# Patient Record
Sex: FEMALE | Race: WHITE | Employment: OTHER | URBAN - METROPOLITAN AREA
[De-identification: names, ages, dates, MRNs, and addresses within clinical notes are randomized per-mention and may not be internally consistent; named-entity substitution may affect disease eponyms.]

---

## 2017-06-22 ENCOUNTER — OFFICE VISIT (OUTPATIENT)
Dept: INTERNAL MEDICINE CLINIC | Age: 72
End: 2017-06-22

## 2017-06-22 VITALS
TEMPERATURE: 98.5 F | BODY MASS INDEX: 24.11 KG/M2 | SYSTOLIC BLOOD PRESSURE: 120 MMHG | RESPIRATION RATE: 16 BRPM | OXYGEN SATURATION: 98 % | HEIGHT: 63 IN | DIASTOLIC BLOOD PRESSURE: 80 MMHG | HEART RATE: 73 BPM | WEIGHT: 136.1 LBS

## 2017-06-22 DIAGNOSIS — I95.1 ORTHOSTASIS: ICD-10-CM

## 2017-06-22 DIAGNOSIS — S66.911D WRIST STRAIN, RIGHT, SUBSEQUENT ENCOUNTER: Primary | ICD-10-CM

## 2017-06-22 NOTE — MR AVS SNAPSHOT
Visit Information Date & Time Provider Department Dept. Phone Encounter #  
 6/22/2017 10:45 AM Armando Pal MD [place] [address] [facility] [phone] Follow-up Instructions Return in about 6 months (around 12/22/2017) for F/u wrist. Upcoming Health Maintenance Date Due ZOSTER VACCINE AGE 60> 1/11/2007 COLONOSCOPY 10/1/2015 GLAUCOMA SCREENING Q2Y 1/1/2017 BREAST CANCER SCRN MAMMOGRAM 4/30/2017 INFLUENZA AGE 9 TO ADULT 8/1/2017 MEDICARE YEARLY EXAM 8/6/2017 Pneumococcal 65+ High/Highest Risk (2 of 2 - PPSV23) 9/27/2017 DTaP/Tdap/Td series (2 - Td) 10/18/2021 Allergies as of 6/22/2017  Review Complete On: 6/22/2017 By: Armando Pal MD  
  
 Severity Noted Reaction Type Reactions Penicillins  07/18/2011    Anaphylaxis Current Immunizations  Reviewed on 10/18/2013 Name Date Influenza Vaccine 10/1/2016, 10/9/2013 Pneumococcal Vaccine (Unspecified Type) 9/27/2012 Tdap 10/18/2011 Not reviewed this visit You Were Diagnosed With   
  
 Codes Comments Wrist strain, right, subsequent encounter    -  Primary ICD-10-CM: T08.659R ICD-9-CM: V58.89, 842.00 Vitals BP Pulse Temp Resp Height(growth percentile) Weight(growth percentile) 120/80 (BP 1 Location: Left arm, BP Patient Position: Sitting) 73 98.5 °F (36.9 °C) (Oral) 16 5' 2.75\" (1.594 m) 136 lb 1.6 oz (61.7 kg) SpO2 BMI OB Status Smoking Status 98% 24.3 kg/m2 Hysterectomy Never Smoker Vitals History BMI and BSA Data Body Mass Index Body Surface Area  
 24.3 kg/m 2 1.65 m 2 Preferred Pharmacy Pharmacy Name Phone [id number] [place], [address] Ziggy Flores [phone] Your Updated Medication List  
  
   
This list is accurate as of: 6/22/17 11:11 AM.  Always use your most recent med list.  
  
  
  
  
 calcium carbonate 200 mg calcium (500 mg) Merleen Prude Commonly known as:  SHERITAS Take 1 Tab by mouth daily. omega-3 fatty acids-vitamin e 1,000 mg Cap Take 1 Cap by mouth.  
  
 pneumococcal 13 humphrey conj dip 0.5 mL Syrg injection Commonly known as:  PREVNAR 13 (PF)  
0.5 mL by IntraMUSCular route PRIOR TO DISCHARGE for 1 dose. PLEASE FAX RECEIPT OF ADMINISTRATION TO OFFICE ([phone]). Give 1 month apart from Zostavax We Performed the Following AMB SUPPLY ORDER [[id number] Custom] Comments:  
 Theraband resistive band (red and green) Follow-up Instructions Return in about 6 months (around 12/22/2017) for F/u wrist.  
  
  
Patient Instructions Stay well hydrated. Change positions slowly. Contract large muscles prior to position change. Wear support stockings. Do wrist stretching exercises. Wrist Sprain: Rehab Exercises Your Care Instructions Here are some examples of typical rehabilitation exercises for your condition. Start each exercise slowly. Ease off the exercise if you start to have pain. Your doctor or your physical or occupational therapist will tell you when you can start these exercises and which ones will work best for you. How to do the exercises Resisted wrist extension 1. Sit leaning forward with your legs slightly spread. Then place your forearm on your thigh with your affected hand and wrist in front of your knee. 2. Grasp one end of an exercise band with your palm down. Step on the other end. 
3. Slowly bend your wrist upward for a count of 2. Then lower your wrist slowly to a count of 5. 
4. Repeat 8 to 12 times. Resisted wrist flexion 1. Sit leaning forward with your legs slightly spread. Then place your forearm on your thigh with your affected hand and wrist in front of your knee. 2. Grasp one end of an exercise band with your palm up. Step on the other end. 
3. Slowly bend your wrist upward for a count of 2. Then lower your wrist slowly to a count of 5. 
4. Repeat 8 to 12 times. Resisted radial deviation 1. Sit leaning forward with your legs slightly spread. Then place your forearm on your thigh with your affected hand and wrist in front of your knee. 2. Grasp one end of an exercise band with your hand facing toward your other thigh. Step on the other end. 
3. Slowly bend your wrist upward for a count of 2. Then lower your wrist slowly to a count of 5. 
4. Repeat 8 to 12 times. Resisted ulnar deviation 1. Sit leaning forward with your legs slightly spread. Then place your forearm on your thigh with your affected hand and wrist by the inside of your knee. 2. Grasp one end of an exercise band with your palm down. Step on the other end with the foot opposite the hand holding the band. 3. Slowly bend your wrist outward and toward your knee for a count of 2. Then slowly move your wrist back to the starting position to a count of 5. 
4. Repeat 8 to 12 times. Resisted forearm pronation 1. Sit leaning forward with your legs slightly spread. Then place your forearm on your thigh with your affected hand and wrist in front of your knee. 2. Grasp one end of an exercise band with your palm up. Step on the other end. 3. Keeping your wrist straight, roll your palm inward toward your thigh for a count of 2. Then slowly move your wrist back to the starting position to a count of 5. 
4. Repeat 8 to 12 times. Resisted supination 1. Sit leaning forward with your legs slightly spread. Then place your forearm on your thigh with your affected hand and wrist in front of your knee. 2. Grasp one end of an exercise band with your palm down. Step on the other end. 3. Keeping your wrist straight, roll your palm outward and away from your thigh for a count of 2. Then slowly move your wrist back to the starting position to a count of 5. 
4. Repeat 8 to 12 times. Follow-up care is a key part of your treatment and safety.  Be sure to make and go to all appointments, and call your doctor if you are having problems. It's also a good idea to know your test results and keep a list of the medicines you take. Where can you learn more? Go to http://lucas-sean.info/. Enter S110 in the search box to learn more about \"Wrist Sprain: Rehab Exercises. \" Current as of: March 21, 2017 Content Version: 11.3 © 3441-5228 [facility]. Care instructions adapted under license by [employer] (which disclaims liability or warranty for this information). If you have questions about a medical condition or this instruction, always ask your healthcare professional. [address] any warranty or liability for your use of this information. Introducing [facility]! [employer] introduces [email or web address] patient portal. Now you can access parts of your medical record, email your doctor's office, and request medication refills online. 1. In your internet browser, go to [email or web address] 2. Click on the First Time User? Click Here link in the Sign In box. You will see the New Member Sign Up page. 3. Enter your [email or web address] Access Code exactly as it appears below. You will not need to use this code after youve completed the sign-up process. If you do not sign up before the expiration date, you must request a new code. · [email or web address] Access Code: [email or web address] Expires: 9/20/2017 11:11 AM 
 
4. Enter the last four digits of your Social Security Number (xxxx) and Date of Birth (mm/dd/yyyy) as indicated and click Submit. You will be taken to the next sign-up page. 5. Create a Clarit ID. This will be your [email or web address] login ID and cannot be changed, so think of one that is secure and easy to remember. 6. Create a [email or web address] password. You can change your password at any time. 7. Enter your Password Reset Question and Answer. This can be used at a later time if you forget your password. 8. Enter your e-mail address. You will receive e-mail notification when new information is available in [address] 9. Click Sign Up. You can now view and download portions of your medical record. 10. Click the Download Summary menu link to download a portable copy of your medical information. If you have questions, please visit the Frequently Asked Questions section of the [employer] website. Remember, [employer] is NOT to be used for urgent needs. For medical emergencies, dial 911. Now available from your iPhone and Android! Please provide this summary of care documentation to your next provider. Your primary care clinician is listed as Dana Domingo. If you have any questions after today's visit, please call [phone].

## 2017-06-22 NOTE — PROGRESS NOTES
Chief Complaint   Patient presents with    Hand Pain     f/u     Reviewed record in preparation for visit and have obtained necessary documentation. Identified pt with two pt identifiers(name and ). Health Maintenance Due   Topic    ZOSTER VACCINE AGE 60>     COLONOSCOPY     GLAUCOMA SCREENING Q2Y     BREAST CANCER SCRN MAMMOGRAM          Chief Complaint   Patient presents with    Hand Pain     f/u        Wt Readings from Last 3 Encounters:   17 136 lb 1.6 oz (61.7 kg)   16 136 lb (61.7 kg)   16 136 lb (61.7 kg)     Temp Readings from Last 3 Encounters:   17 98.5 °F (36.9 °C) (Oral)   16 97.1 °F (36.2 °C) (Oral)   16 96.4 °F (35.8 °C) (Oral)     BP Readings from Last 3 Encounters:   17 120/80   16 120/60   16 122/74     Pulse Readings from Last 3 Encounters:   17 73   16 (!) 56   16 87           Learning Assessment:  :     Learning Assessment 3/6/2014   PRIMARY LEARNER Patient   HIGHEST LEVEL OF EDUCATION - PRIMARY LEARNER  4 YEARS OF COLLEGE   BARRIERS PRIMARY LEARNER NONE   CO-LEARNER CAREGIVER No   PRIMARY LANGUAGE ENGLISH    NEED No   LEARNER PREFERENCE PRIMARY LISTENING     READING     DEMONSTRATION   LEARNING SPECIAL TOPICS no   ANSWERED BY patient   RELATIONSHIP SELF   ASSESSMENT COMMENT none       Depression Screening:  :     PHQ over the last two weeks 2016   Little interest or pleasure in doing things Not at all   Feeling down, depressed or hopeless Not at all   Total Score PHQ 2 0       Fall Risk Assessment:  :     Fall Risk Assessment, last 12 mths 2016   Able to walk? Yes   Fall in past 12 months? Yes   Fall with injury? No   Number of falls in past 12 months 1   Fall Risk Score 1       Abuse Screening:  :     Abuse Screening Questionnaire 3/6/2014   Do you ever feel afraid of your partner? N   Are you in a relationship with someone who physically or mentally threatens you?  N   Is it safe for you to go home? Y       Coordination of Care Questionnaire:  :     1) Have you been to an emergency room, urgent care clinic since your last visit? no   Hospitalized since your last visit? no             2) Have you seen or consulted any other health care providers outside of [address] [place] [address] since your last visit? no  (Include any pap smears or colon screenings in this section.)    3) Do you have an Advance Directive on file? no    4) Are you interested in receiving information on Advance Directives? NO      Patient is accompanied by self I have received verbal consent from Emily Cordova to discuss any/all medical information while they are present in the room. Reviewed record  In preparation for visit and have obtained necessary documentation.

## 2017-06-22 NOTE — PATIENT INSTRUCTIONS
Stay well hydrated. Change positions slowly. Contract large muscles prior to position change. Wear support stockings. Do wrist stretching exercises. Wrist Sprain: Rehab Exercises  Your Care Instructions  Here are some examples of typical rehabilitation exercises for your condition. Start each exercise slowly. Ease off the exercise if you start to have pain. Your doctor or your physical or occupational therapist will tell you when you can start these exercises and which ones will work best for you. How to do the exercises  Resisted wrist extension    1. Sit leaning forward with your legs slightly spread. Then place your forearm on your thigh with your affected hand and wrist in front of your knee. 2. Grasp one end of an exercise band with your palm down. Step on the other end.  3. Slowly bend your wrist upward for a count of 2. Then lower your wrist slowly to a count of 5.  4. Repeat 8 to 12 times. Resisted wrist flexion    1. Sit leaning forward with your legs slightly spread. Then place your forearm on your thigh with your affected hand and wrist in front of your knee. 2. Grasp one end of an exercise band with your palm up. Step on the other end.  3. Slowly bend your wrist upward for a count of 2. Then lower your wrist slowly to a count of 5.  4. Repeat 8 to 12 times. Resisted radial deviation    1. Sit leaning forward with your legs slightly spread. Then place your forearm on your thigh with your affected hand and wrist in front of your knee. 2. Grasp one end of an exercise band with your hand facing toward your other thigh. Step on the other end.  3. Slowly bend your wrist upward for a count of 2. Then lower your wrist slowly to a count of 5.  4. Repeat 8 to 12 times. Resisted ulnar deviation    1. Sit leaning forward with your legs slightly spread. Then place your forearm on your thigh with your affected hand and wrist by the inside of your knee.   2. Grasp one end of an exercise band with your palm down. Step on the other end with the foot opposite the hand holding the band. 3. Slowly bend your wrist outward and toward your knee for a count of 2. Then slowly move your wrist back to the starting position to a count of 5.  4. Repeat 8 to 12 times. Resisted forearm pronation    1. Sit leaning forward with your legs slightly spread. Then place your forearm on your thigh with your affected hand and wrist in front of your knee. 2. Grasp one end of an exercise band with your palm up. Step on the other end. 3. Keeping your wrist straight, roll your palm inward toward your thigh for a count of 2. Then slowly move your wrist back to the starting position to a count of 5.  4. Repeat 8 to 12 times. Resisted supination    1. Sit leaning forward with your legs slightly spread. Then place your forearm on your thigh with your affected hand and wrist in front of your knee. 2. Grasp one end of an exercise band with your palm down. Step on the other end. 3. Keeping your wrist straight, roll your palm outward and away from your thigh for a count of 2. Then slowly move your wrist back to the starting position to a count of 5.  4. Repeat 8 to 12 times. Follow-up care is a key part of your treatment and safety. Be sure to make and go to all appointments, and call your doctor if you are having problems. It's also a good idea to know your test results and keep a list of the medicines you take. Where can you learn more? Go to http://lucas-sean.info/. Enter S110 in the search box to learn more about \"Wrist Sprain: Rehab Exercises. \"  Current as of: March 21, 2017  Content Version: 11.3  © 6558-0043 [employer]. Care instructions adapted under license by [facility] (which disclaims liability or warranty for this information).  If you have questions about a medical condition or this instruction, always ask your healthcare professional. Lloyd Cardoza any warranty or liability for your use of this information.

## 2017-06-22 NOTE — PROGRESS NOTES
Subjective:     Chief Complaint   Patient presents with    Hand Pain     f/u     She  is a 81y.o. year old female who presents for evaluation. Here for follow up of right wrist pain. It has gotten better but when she works on the computer a lot she will have pain in thenar region of her hand. Describes am episode that occurred several weeks ago where she was standing and felt lightheaded and dizzy with a sensation of her heart beating hard. She had that happen one other time as well. Historical Data    Past Medical History:   Diagnosis Date    Arthritis     spine    Ductal carcinoma in situ (DCIS) of right breast 2009    lumpectomy x2, XRT (followed by Dr. Toni Pabon)   Joanna Ernandez History of colon polyps     Osteopenia 09/2016    mild, R fem neck -1.0        Past Surgical History:   Procedure Laterality Date    HX BLADDER SUSPENSION      HX BREAST LUMPECTOMY      x2 (right)    HX COLONOSCOPY  2010    normal (but has had a h/o colon polyps), repeat q5 yrs, Dr. Ariana Emerson Prescriptions as of 6/22/2017   Medication Sig Dispense Refill    pneumococcal 13 humphrey conj dip (PREVNAR 13, PF,) 0.5 mL syrg injection 0.5 mL by IntraMUSCular route PRIOR TO DISCHARGE for 1 dose. PLEASE FAX RECEIPT OF ADMINISTRATION TO OFFICE ([phone]). Give 1 month apart from Zostavax 0.5 mL 0    calcium carbonate (TUMS) 200 mg calcium (500 mg) Chew Take 1 Tab by mouth daily. 30 Tab 3    omega-3 fatty acids-vitamin e 1,000 mg cap Take 1 Cap by mouth. No facility-administered encounter medications on file as of 6/22/2017. Allergies   Allergen Reactions    Penicillins Anaphylaxis        Social History     Social History    Marital status: SINGLE     Spouse name: N/A    Number of children: N/A    Years of education: N/A     Occupational History    Not on file. Social History Main Topics    Smoking status: Never Smoker    Smokeless tobacco: Never Used    Alcohol use No    Drug use:  No  Sexual activity: Not Currently     Other Topics Concern    Not on file     Social History Narrative    ** Merged History Encounter **             Review of Systems  Pertinent items are noted in HPI. Objective:     Vitals:    06/22/17 1043   BP: 120/80   Pulse: 73   Resp: 16   Temp: 98.5 °F (36.9 °C)   TempSrc: Oral   SpO2: 98%   Weight: 136 lb 1.6 oz (61.7 kg)   Height: 5' 2.75\" (1.594 m)     Pleasant WF. Orthostatic BP:  138/90 lying / 110/70 standing  Right hand: Slight tenderness over the thenar eminence. Pain with extension of wrist.    ASSESSMENT / PLAN:   1. Wrist strain, right, subsequent encounter  · PT offered but declined. · Home rehabilitation exercises provided. - AMB SUPPLY ORDER    2. Orthostasis  · Stay well hydrated. · Avoid rapid position changes. · Support stockings. · Additional studies if persists. Patient Instructions   Stay well hydrated. Change positions slowly. Contract large muscles prior to position change. Wear support stockings. Do wrist stretching exercises. Wrist Sprain: Rehab Exercises  Your Care Instructions  Here are some examples of typical rehabilitation exercises for your condition. Start each exercise slowly. Ease off the exercise if you start to have pain. Your doctor or your physical or occupational therapist will tell you when you can start these exercises and which ones will work best for you. How to do the exercises  Resisted wrist extension    3. Sit leaning forward with your legs slightly spread. Then place your forearm on your thigh with your affected hand and wrist in front of your knee. 4. Grasp one end of an exercise band with your palm down. Step on the other end.  5. Slowly bend your wrist upward for a count of 2. Then lower your wrist slowly to a count of 5.  6. Repeat 8 to 12 times. Resisted wrist flexion    5. Sit leaning forward with your legs slightly spread.  Then place your forearm on your thigh with your affected hand and wrist in front of your knee. 6. Grasp one end of an exercise band with your palm up. Step on the other end.  7. Slowly bend your wrist upward for a count of 2. Then lower your wrist slowly to a count of 5.  8. Repeat 8 to 12 times. Resisted radial deviation    1. Sit leaning forward with your legs slightly spread. Then place your forearm on your thigh with your affected hand and wrist in front of your knee. 2. Grasp one end of an exercise band with your hand facing toward your other thigh. Step on the other end.  3. Slowly bend your wrist upward for a count of 2. Then lower your wrist slowly to a count of 5.  4. Repeat 8 to 12 times. Resisted ulnar deviation    1. Sit leaning forward with your legs slightly spread. Then place your forearm on your thigh with your affected hand and wrist by the inside of your knee. 2. Grasp one end of an exercise band with your palm down. Step on the other end with the foot opposite the hand holding the band. 3. Slowly bend your wrist outward and toward your knee for a count of 2. Then slowly move your wrist back to the starting position to a count of 5.  4. Repeat 8 to 12 times. Resisted forearm pronation    1. Sit leaning forward with your legs slightly spread. Then place your forearm on your thigh with your affected hand and wrist in front of your knee. 2. Grasp one end of an exercise band with your palm up. Step on the other end. 3. Keeping your wrist straight, roll your palm inward toward your thigh for a count of 2. Then slowly move your wrist back to the starting position to a count of 5.  4. Repeat 8 to 12 times. Resisted supination    1. Sit leaning forward with your legs slightly spread. Then place your forearm on your thigh with your affected hand and wrist in front of your knee. 2. Grasp one end of an exercise band with your palm down. Step on the other end. 3. Keeping your wrist straight, roll your palm outward and away from your thigh for a count of 2.  Then slowly move your wrist back to the starting position to a count of 5.  4. Repeat 8 to 12 times. Follow-up care is a key part of your treatment and safety. Be sure to make and go to all appointments, and call your doctor if you are having problems. It's also a good idea to know your test results and keep a list of the medicines you take. Where can you learn more? Go to http://lucas-sean.info/. Enter S110 in the search box to learn more about \"Wrist Sprain: Rehab Exercises. \"  Current as of: March 21, 2017  Content Version: 11.3  © 7518-4343 [employer]. Care instructions adapted under license by [employer] (which disclaims liability or warranty for this information). If you have questions about a medical condition or this instruction, always ask your healthcare professional. [place] [address] any warranty or liability for your use of this information. Follow-up Disposition:  Return in about 6 months (around 12/22/2017) for F/u wrist.   Advised her to call back or return to office if symptoms worsen/change/persist.  Discussed expected course/resolution/complications of diagnosis in detail with patient. Medication risks/benefits/costs/interactions/alternatives discussed with patient. She was given an after visit summary which includes diagnoses, current medications, & vitals. She expressed understanding with the diagnosis and plan.

## 2017-08-18 ENCOUNTER — OFFICE VISIT (OUTPATIENT)
Dept: INTERNAL MEDICINE CLINIC | Age: 72
End: 2017-08-18

## 2017-08-18 VITALS
DIASTOLIC BLOOD PRESSURE: 70 MMHG | RESPIRATION RATE: 18 BRPM | OXYGEN SATURATION: 98 % | BODY MASS INDEX: 23.39 KG/M2 | HEIGHT: 63 IN | TEMPERATURE: 97.1 F | WEIGHT: 132 LBS | SYSTOLIC BLOOD PRESSURE: 120 MMHG | HEART RATE: 69 BPM

## 2017-08-18 DIAGNOSIS — Z13.39 SCREENING FOR ALCOHOLISM: ICD-10-CM

## 2017-08-18 DIAGNOSIS — Z00.00 MEDICARE ANNUAL WELLNESS VISIT, SUBSEQUENT: Primary | ICD-10-CM

## 2017-08-18 DIAGNOSIS — Z12.11 COLON CANCER SCREENING: ICD-10-CM

## 2017-08-18 NOTE — PROGRESS NOTES
Chief Complaint   Patient presents with   [address] [place] [address] Annual Wellness Visit        1. Have you been to the ER, urgent care clinic since your last visit? No  Hospitalized since your last visit? No    2. Have you seen or consulted any other health care providers outside of the [address] [place] [address] since your last visit? Yes, dentist  Include any pap smears or colon screening.  No

## 2017-08-18 NOTE — PROGRESS NOTES
Dr. Darren Adamson referred Soo Rivera, 1945, a 81 y.o. female for a Medicare Annual Wellness Visit (AWV)      This is a Subsequent Medicare Annual Wellness Visit providing Personalized Prevention Plan Services (PPPS) (Performed 12 months after initial AWV and PPPS )    I have reviewed the patient's medical history in detail and updated the computerized patient record. History     Past Medical History:   Diagnosis Date    Arthritis     spine    Ductal carcinoma in situ (DCIS) of right breast 2009    lumpectomy x2, XRT (followed by Dr. Scot Amin)   [facility] History of colon polyps     Osteopenia 09/2016    mild, R fem neck -1.0      Past Surgical History:   Procedure Laterality Date    HX BLADDER SUSPENSION      HX BREAST LUMPECTOMY      x2 (right)    HX COLONOSCOPY  2010    normal (but has had a h/o colon polyps), repeat q5 yrs, Dr. Landon Corley     Current Outpatient Prescriptions   Medication Sig Dispense Refill    calcium carbonate (TUMS) 200 mg calcium (500 mg) Chew Take 1 Tab by mouth daily. 30 Tab 3     Allergies   Allergen Reactions    Penicillins Anaphylaxis     Family History   Problem Relation Age of Onset    Cancer Mother      breast, bone    Cancer Father      kidney    No Known Problems Sister      Social History   Substance Use Topics    Smoking status: Never Smoker    Smokeless tobacco: Never Used    Alcohol use No     Patient Active Problem List   Diagnosis Code    Sciatica M54.30    DCIS (ductal carcinoma in situ) D05.10    S/P DAMIR-BSO Z90.710, Z90.722, Z90.79    Hx of colonic polyps Z86.010    Hx of lumpectomy Z98.890    Advanced care planning/counseling discussion Z71.89    Osteopenia M85.80       Depression Risk Factor Screening:     PHQ over the last two weeks 8/18/2017   Little interest or pleasure in doing things Not at all   Feeling down, depressed or hopeless Not at all   Total Score PHQ 2 0     Alcohol Risk Factor Screening:    On any occasion during the past 3 months, have you had more than 3 drinks containing alcohol? No    Do you average more than 7 drinks per week? No        Functional Ability and Level of Safety:     Hearing Loss   normal-to-mild    Activities of Daily Living   Self-care. Requires assistance with: no ADLs    Fall Risk   Fall Risk Assessment, last 12 mths 8/18/2017   Able to walk? Yes   Fall in past 12 months? No   Fall with injury? -   Number of falls in past 12 months -   Fall Risk Score -     Abuse Screen   Patient is not abused    Review of Systems   Not required    Physical Examination     Evaluation of Cognitive Function:  Mood/affect:  happy  Appearance: age appropriate and casually dressed  Family member/caregiver input: none present    No exam performed today, not required for AWV. Patient Care Team:  India Sheth MD as PCP - General (Internal Medicine)  Willie Sanchez MD (Gastroenterology)    Advice/Referrals/Counseling   Education and counseling provided:  End-of-Life planning (with patient's consent)  Pneumococcal Vaccine  Influenza Vaccine  Screening Mammography  Screening Pap and pelvic (covered once every 2 years)  Colorectal cancer screening tests  Cardiovascular screening blood test  Bone mass measurement (DEXA)  Screening for glaucoma  Diabetes screening test  Tdap and Zostavax      Assessment/Plan       ICD-10-CM ICD-9-CM    1. Medicare annual wellness visit, subsequent Z00.00 V70.0 OCCULT BLOOD, IMMUNOASSAY (FIT)   2. Colon cancer screening Z12.11 V76.51 OCCULT BLOOD, IMMUNOASSAY (FIT)   3. Screening for alcoholism Z13.89 V79.1      4. Medicare Annual Wellness Visit:  ----Immunizations: Patient confirmed the following records of vaccinations are correct and current.   Immunization History   Administered Date(s) Administered    Influenza Vaccine 10/09/2013, 10/01/2016    Pneumococcal Conjugate (PCV-13) 11/11/2016    Tdap 10/18/2011    ZZZ-RETIRED (DO NOT USE) Pneumococcal Vaccine (Unspecified Type) 09/27/2012 Patient is due for a zostavax vaccine but has had trouble trying to get it at her pharmacy. Every time she goes to get it they tell her they do not have it in stock so she has given up on it. She also is discouraged by the price of it. Encouraged her to get a flu shot this fall which she always does. She is up to date on both pneumonia and Tdap.     ----Screenings: See chart in patient instructions for specific information. ADL's, Fall Risk, Depression Screening and Abuse screening information is reported above. Patient is past due for a colonoscopy and states that she really keeps putting it off because of the bowel prep regimen. Educated her about the fit test and she is very interested in trying this. Gave her the packet today and placed the order. Advised patient to make an eye appointment soon.     ----Medication Reconciliation was performed today and the following changes were made:  Medications Discontinued During This Encounter   Medication Reason    pneumococcal 13 humphrey conj dip (PREVNAR 13, PF,) 0.5 mL syrg injection Not A Current Medication    omega-3 fatty acids-vitamin e 1,000 mg cap Not A Current Medication     ----Advanced Care Plan: Patient educated on the importance of an advanced care plan and paperwork was given to the patient today. Asked patient to read over the information and bring it back to her next appointment with her physician. Patient verbalized understanding of information presented. Answered all of the patient's questions. AVS handed and reviewed with patient which includes a Medicare Wellness Preventative Screening Table and patient specific information. Notification of recommendations will be sent to Dr. Deisy Jaquez for review.     Radha Gibson, PharmD, BCPS, CDE

## 2017-08-18 NOTE — PATIENT INSTRUCTIONS
Medicare Part B Preventive Services Limitations Recommendation Scheduled   Bone Mass Measurement  (age 72 & older, biennial) Requires diagnosis related to osteoporosis or estrogen deficiency. Biennial benefit unless patient has history of long-term glucocorticoid tx or baseline is needed because initial test was by other method Last: 9/1/16    A DEXA scan is recommended after you turn 72years of age to determine your risk for osteoporosis Next: As recommended by your physician   Colorectal Cancer Screening  -Fecal occult blood test (annual)  -Flexible sigmoidoscopy (5y)  -Screening colonoscopy (10y)  -Barium Enema  Last: 2010    Every 5-10 years depending on your colonoscopy result starting at age 48 years Next: Due now   Glaucoma Screening (no USPSTF recommendation) Diabetes mellitus, family history, , age 48 or over,  American, age 72 or over Last: 2-3 years ago    Every year Next:Due now   Screening Mammography (biennial age 54-69) Annually (age 36 or over) Last: 2016 Next: Gets this done every year   Screening Pap Tests and Pelvic Examination (up to age 72 and after 72 if unknown history or abnormal study last 10 years) Every 19 months except high risk Last: NA Next:    Discuss with your doctor if this screening is appropriate for you. Cardiovascular Screening Blood Tests (every 5 years)  Total cholesterol, HDL, Triglycerides Order as a panel if possible Last: 8/5/16    Every Year Next: 8/2017 or as recommended by your physician   Diabetes Screening Tests (at least every 3 years, Medicare covers annually or at 6-month intervals for prediabetic patients)    Fasting blood sugar (FBS) or glucose tolerance test (GTT) Patient must be diagnosed with one of the following:  -Hypertension, Dyslipidemia, obesity, previous impaired FBS or GTT  Or any two of the following: overweight, FH of diabetes, age ? 72, history of gestational diabetes, birth of baby weighing more than 9 pounds Last: 8/5/16    Diabetic: Every 3-6 months depending on your result    Non-Diabetic: Every 3 years    Next: Check with yearly labs   Diabetes Self-Management Training (DSMT) (no USPSTF recommendation) Requires referral by treating physician for patient with diabetes or renal disease. 10 hours of initial DSMT session of no less than 30 minutes each in a continuous 12-month period. 2 hours of follow-up DSMT in subsequent years. Last: NA Talk to your doctor if you are interested in a refresher course. Medical Nutrition Therapy (MNT) (for diabetes or renal disease not recommended schedule) Requires referral by treating physician for patient with diabetes or renal disease. Can be provided in same year as diabetes self-management training (DSMT), and CMS recommends medical nutrition therapy take place after DSMT. Up to 3 hours for initial year and 2 hours in subsequent years. Last: NA Talk to your doctor if you are interested in a refresher course. Seasonal Influenza Vaccination (annually)  Last: 2016    Every Fall Please get one this Fall. Pneumococcal Vaccination (once after 65)  Last:  Pneumovax:  2012  Prevnar-13:  2016 Complete   Shingles Vaccination  Last:    A shingles vaccine is recommended once in a lifetime after age 61 Please get one at your pharmacy. Tetanus, Diphtheria and Pertussis (Tdap) Vaccination Booster One Booster as an adult and then tetanus every 10 years or as indicated Last: 2011   Complete   Hepatitis B Vaccinations (if medium/high risk) Medium/high risk factors:  End-stage renal disease,  Hemophiliacs who received Factor VIII or IX concentrates, Clients of institutions for the mentally retarded, Persons who live in the same house as a HepB virus carrier, Homosexual men, Illicit injectable drug abusers.  Last: NA Next: NA   Counseling to Prevent Tobacco Use (up to 8 sessions per year)  - Counseling greater than 3 and up to 10 minutes  - Counseling greater than 10 minutes Patients must be asymptomatic of tobacco-related conditions to receive as preventive service Last: NA Next: NA   Human Immunodeficiency Virus (HIV) Screening (annually for increased risk patients)  HIV-1 and HIV-2 by EIA, TERRI, rapid antibody test, or oral mucosa transudate Patient must be at increased risk for HIV infection per USPSTF guidelines or pregnant. Tests covered annually for patients at increased risk. Pregnant patients may receive up to 3 test during pregnancy. Last: NA Next: NA   Ultrasound Screening for Abdominal Aortic Aneurysm (AAA) once Patient must be referred through IPPE and not have had a screening for abdominal aortic aneurysm before under medicare. Limited to patients who meet onf of the following criteria:  -Men who are 73-68 years old and have smoked more than 100 cigarettes in their lifetime  -Anyone with a FH of AAA  -Anyone recommended for screening by the USPSFTF As recommended by your PCP or Specialist   As recommended by your PCP or Specialist     NA = Not Applicable; NI= Not Indicated     1) Please complete your stool cards and bring back to the clinic    2) Read over your advanced care planning paperwork and bring back to your next appointment.     3) Schedule your eye exam

## 2017-09-09 ENCOUNTER — HOSPITAL ENCOUNTER (OUTPATIENT)
Dept: LAB | Age: 72
Discharge: HOME | End: 2017-09-09
Payer: MEDICARE

## 2017-09-09 PROCEDURE — 82270 OCCULT BLOOD FECES: CPT

## 2017-09-16 LAB — HEMOCCULT STL QL IA: NEGATIVE

## 2017-10-24 ENCOUNTER — OFFICE VISIT (OUTPATIENT)
Dept: INTERNAL MEDICINE CLINIC | Age: 72
End: 2017-10-24

## 2017-10-24 VITALS
OXYGEN SATURATION: 98 % | TEMPERATURE: 96.5 F | WEIGHT: 133.3 LBS | SYSTOLIC BLOOD PRESSURE: 110 MMHG | RESPIRATION RATE: 18 BRPM | BODY MASS INDEX: 23.62 KG/M2 | HEIGHT: 63 IN | HEART RATE: 66 BPM | DIASTOLIC BLOOD PRESSURE: 70 MMHG

## 2017-10-24 DIAGNOSIS — J01.90 ACUTE SINUSITIS, RECURRENCE NOT SPECIFIED, UNSPECIFIED LOCATION: Primary | ICD-10-CM

## 2017-10-24 DIAGNOSIS — H10.32 ACUTE BACTERIAL CONJUNCTIVITIS OF LEFT EYE: ICD-10-CM

## 2017-10-24 DIAGNOSIS — R55 SYNCOPE, UNSPECIFIED SYNCOPE TYPE: ICD-10-CM

## 2017-10-24 RX ORDER — SULFAMETHOXAZOLE AND TRIMETHOPRIM 800; 160 MG/1; MG/1
1 TABLET ORAL 2 TIMES DAILY
Qty: 20 TAB | Refills: 0 | Status: SHIPPED | OUTPATIENT
Start: 2017-10-24 | End: 2017-11-03

## 2017-10-24 NOTE — PROGRESS NOTES
Subjective:     Chief Complaint   Patient presents with    URI     cough    Eye Swelling     left    Other     passed out last Thursday morning     She  is a 81y.o. year old female who presents for evaluation. Went to [place] last weekend. Cold and rainy. Three days later got sick. Thursday am after shower felt weak and had to hold on to wall. Woke up on the floor after passing out. Head cold and coughing now. Had chills one night. Has had both pneumonia vaccines and the flu shot this season. Sleeping a lot. No appetite. Went to work yesterday. Woke up at 3 am coughing and couldn't open left eye because it was pasted shut. Historical Data    Past Medical History:   Diagnosis Date    Arthritis     spine    Ductal carcinoma in situ (DCIS) of right breast 2009    lumpectomy x2, XRT (followed by Dr. Remi Valdez)   [facility] History of colon polyps     Osteopenia 09/2016    mild, R fem neck -1.0        Past Surgical History:   Procedure Laterality Date    HX BLADDER SUSPENSION      HX BREAST LUMPECTOMY      x2 (right)    HX COLONOSCOPY  2010    normal (but has had a h/o colon polyps), repeat q5 yrs, Dr. Balbir Barrios Prescriptions as of 10/24/2017   Medication Sig Dispense Refill    calcium carbonate (TUMS) 200 mg calcium (500 mg) Chew Take 1 Tab by mouth daily. 30 Tab 3     No facility-administered encounter medications on file as of 10/24/2017. Allergies   Allergen Reactions    Penicillins Anaphylaxis        Social History     Social History    Marital status: SINGLE     Spouse name: N/A    Number of children: N/A    Years of education: N/A     Occupational History    Not on file.      Social History Main Topics    Smoking status: Never Smoker    Smokeless tobacco: Never Used    Alcohol use No    Drug use: No    Sexual activity: Not Currently     Other Topics Concern    Not on file     Social History Narrative    ** Merged History Encounter **             Review of Systems  Pertinent items are noted in HPI. Objective:     Vitals:    10/24/17 1151   BP: 110/70   Pulse: 66   Resp: 18   Temp: 96.5 °F (35.8 °C)   TempSrc: Oral   SpO2: 98%   Weight: 133 lb 4.8 oz (60.5 kg)   Height: 5' 3\" (1.6 m)     Pleasant WF in no acute distress. Ears: Some cerumen. Throat: Some cobblestoning. Eyes:  O.S. Injected and lid margin with sign of infection. Lungs: Clear to auscultation. Cardiac: RRR without murmurs, gallops or rubs. ASSESSMENT / PLAN:   1. Acute sinusitis, recurrence not specified, unspecified location  · Anticipatory guidance. - trimethoprim-sulfamethoxazole (BACTRIM DS, SEPTRA DS) 160-800 mg per tablet; Take 1 Tab by mouth two (2) times a day for 10 days. Dispense: 20 Tab; Refill: 0    2. Acute bacterial conjunctivitis of left eye  · As above  - trimethoprim-sulfamethoxazole (BACTRIM DS, SEPTRA DS) 160-800 mg per tablet; Take 1 Tab by mouth two (2) times a day for 10 days. Dispense: 20 Tab; Refill: 0    3. Syncope, unspecified syncope type  · Related to acute illness and hot shower so likely not due to underlying pathologic entity. Follow-up Disposition: Not on File   Advised her to call back or return to office if symptoms worsen/change/persist.  Discussed expected course/resolution/complications of diagnosis in detail with patient. Medication risks/benefits/costs/interactions/alternatives discussed with patient. She was given an after visit summary which includes diagnoses, current medications, & vitals. She expressed understanding with the diagnosis and plan.

## 2017-10-24 NOTE — MR AVS SNAPSHOT
Visit Information Date & Time Provider Department Dept. Phone Encounter #  
 10/24/2017 11:45 AM MD Aguilar [facility] [id number] [phone] Your Appointments 12/21/2017  1:30 PM  
ROUTINE CARE with MD Aguilar [facility]) Appt Note: 6m fu  
 [address] , [address] [place] [address]  
[address], [place] [phone] Upcoming Health Maintenance Date Due ZOSTER VACCINE AGE 60> 11/11/2006 COLONOSCOPY 10/1/2015 GLAUCOMA SCREENING Q2Y 1/1/2017 BREAST CANCER SCRN MAMMOGRAM 4/30/2017 INFLUENZA AGE 9 TO ADULT 8/1/2017 MEDICARE YEARLY EXAM 8/19/2018 DTaP/Tdap/Td series (2 - Td) 10/18/2021 Allergies as of 10/24/2017  Review Complete On: 10/24/2017 By: Abdulkadir Giron MD  
  
 Severity Noted Reaction Type Reactions Penicillins  07/18/2011    Anaphylaxis Current Immunizations  Reviewed on 10/18/2013 Name Date Influenza Vaccine 10/1/2016, 10/9/2013 Pneumococcal Conjugate (PCV-13) 11/11/2016 Tdap 10/18/2011 ZZZ-RETIRED (DO NOT USE) Pneumococcal Vaccine (Unspecified Type) 9/27/2012 Not reviewed this visit You Were Diagnosed With   
  
 Codes Comments Acute sinusitis, recurrence not specified, unspecified location    -  Primary ICD-10-CM: J01.90 ICD-9-CM: 461.9 Acute bacterial conjunctivitis of left eye     ICD-10-CM: H10.32 
ICD-9-CM: 372.03 Vitals BP Pulse Temp Resp Height(growth percentile) Weight(growth percentile) 110/70 (BP 1 Location: Right arm, BP Patient Position: Sitting) 66 96.5 °F (35.8 °C) (Oral) 18 5' 3\" (1.6 m) 133 lb 4.8 oz (60.5 kg) LMP SpO2 BMI OB Status Smoking Status (Exact Date) 98% 23.61 kg/m2 Hysterectomy Never Smoker BMI and BSA Data Body Mass Index Body Surface Area  
 23.61 kg/m 2 1.64 m 2 Preferred Pharmacy Pharmacy Name Phone [id number]- [place], [address] [place] [facility] [phone] Your Updated Medication List  
  
   
This list is accurate as of: 10/24/17 12:12 PM.  Always use your most recent med list.  
  
  
  
  
 calcium carbonate 200 mg calcium (500 mg) [facility] Commonly known as:  TUMS Take 1 Tab by mouth daily. trimethoprim-sulfamethoxazole 160-800 mg per tablet Commonly known as:  BACTRIM DS, SEPTRA DS Take 1 Tab by mouth two (2) times a day for 10 days. Prescriptions Sent to Pharmacy Refills  
 trimethoprim-sulfamethoxazole (BACTRIM DS, SEPTRA DS) 160-800 mg per tablet 0 Sig: Take 1 Tab by mouth two (2) times a day for 10 days. Class: Normal  
 Pharmacy: [facility]/pharmacy [address] [place] [address] #: [phone] Route: Oral  
  
Patient Instructions Drink plenty of fluids. Take Bactrim DS twice a day for ten days. Use warm moist compress to left eye as needed. Introducing [facility]! Scarlet Salas introduces [employer] patient portal. Now you can access parts of your medical record, email your doctor's office, and request medication refills online. 1. In your internet browser, go to [email or web address] 2. Click on the First Time User? Click Here link in the Sign In box. You will see the New Member Sign Up page. 3. Enter your [employer] Access Code exactly as it appears below. You will not need to use this code after youve completed the sign-up process. If you do not sign up before the expiration date, you must request a new code. · [employer] Access Code: [email or web address] Expires: 1/22/2018 12:12 PM 
 
4. Enter the last four digits of your Social Security Number (xxxx) and Date of Birth (mm/dd/yyyy) as indicated and click Submit. You will be taken to the next sign-up page. 5. Create a [employer] ID.  This will be your [employer] login ID and cannot be changed, so think of one that is secure and easy to remember. 6. Create a [employer] password. You can change your password at any time. 7. Enter your Password Reset Question and Answer. This can be used at a later time if you forget your password. 8. Enter your e-mail address. You will receive e-mail notification when new information is available in [address] 9. Click Sign Up. You can now view and download portions of your medical record. 10. Click the Download Summary menu link to download a portable copy of your medical information. If you have questions, please visit the Frequently Asked Questions section of the [employer] website. Remember, [employer] is NOT to be used for urgent needs. For medical emergencies, dial 911. Now available from your iPhone and Android! Please provide this summary of care documentation to your next provider. Your primary care clinician is listed as Mikhail Quiñones. If you have any questions after today's visit, please call [phone].

## 2017-10-24 NOTE — PROGRESS NOTES
Chief Complaint   Patient presents with    URI     cough    Eye Swelling     left    Other     passed out last Thursday morning     Reviewed record in preparation for visit and have obtained necessary documentation. Identified pt with two pt identifiers(name and ). Health Maintenance Due   Topic    ZOSTER VACCINE AGE 60>     COLONOSCOPY     GLAUCOMA SCREENING Q2Y     BREAST CANCER SCRN MAMMOGRAM     INFLUENZA AGE 9 TO ADULT          Chief Complaint   Patient presents with    URI     cough    Eye Swelling     left    Other     passed out last Thursday morning        Wt Readings from Last 3 Encounters:   10/24/17 133 lb 4.8 oz (60.5 kg)   17 132 lb (59.9 kg)   17 136 lb 1.6 oz (61.7 kg)     Temp Readings from Last 3 Encounters:   10/24/17 96.5 °F (35.8 °C) (Oral)   17 97.1 °F (36.2 °C) (Oral)   17 98.5 °F (36.9 °C) (Oral)     BP Readings from Last 3 Encounters:   10/24/17 110/70   17 120/70   17 120/80     Pulse Readings from Last 3 Encounters:   10/24/17 66   17 69   17 73           Learning Assessment:  :     Learning Assessment 2017 3/6/2014   PRIMARY LEARNER Patient Patient   HIGHEST LEVEL OF EDUCATION - PRIMARY LEARNER  4 YEARS OF COLLEGE 4 YEARS OF COLLEGE   BARRIERS PRIMARY LEARNER NONE NONE   CO-LEARNER CAREGIVER No No   PRIMARY LANGUAGE ENGLISH ENGLISH    NEED - No   LEARNER PREFERENCE PRIMARY DEMONSTRATION LISTENING     LISTENING READING     READING DEMONSTRATION   LEARNING SPECIAL TOPICS - no   ANSWERED BY patient patient   RELATIONSHIP SELF SELF   ASSESSMENT COMMENT - none       Depression Screening:  :     PHQ over the last two weeks 2017   Little interest or pleasure in doing things Not at all   Feeling down, depressed or hopeless Not at all   Total Score PHQ 2 0       Fall Risk Assessment:  :     Fall Risk Assessment, last 12 mths 2017   Able to walk? Yes   Fall in past 12 months? No   Fall with injury?  - Number of falls in past 12 months -   Fall Risk Score -       Abuse Screening:  :     Abuse Screening Questionnaire 8/18/2017 6/22/2017 3/6/2014   Do you ever feel afraid of your partner? N N N   Are you in a relationship with someone who physically or mentally threatens you? N N N   Is it safe for you to go home? Y Y Y       Coordination of Care Questionnaire:  :     1) Have you been to an emergency room, urgent care clinic since your last visit? no   Hospitalized since your last visit? no             2) Have you seen or consulted any other health care providers outside of [address] [place] [address] since your last visit? no  (Include any pap smears or colon screenings in this section.)    3) Do you have an Advance Directive on file? no    4) Are you interested in receiving information on Advance Directives? NO      Patient is accompanied by self I have received verbal consent from Wild Elizalde to discuss any/all medical information while they are present in the room. Reviewed record  In preparation for visit and have obtained necessary documentation.

## 2017-10-24 NOTE — PATIENT INSTRUCTIONS
Drink plenty of fluids. Take Bactrim DS twice a day for ten days. Use warm moist compress to left eye as needed.

## 2017-12-21 ENCOUNTER — OFFICE VISIT (OUTPATIENT)
Dept: INTERNAL MEDICINE CLINIC | Age: 72
End: 2017-12-21

## 2017-12-21 VITALS
HEIGHT: 63 IN | WEIGHT: 136.2 LBS | SYSTOLIC BLOOD PRESSURE: 130 MMHG | HEART RATE: 90 BPM | DIASTOLIC BLOOD PRESSURE: 80 MMHG | TEMPERATURE: 97.3 F | BODY MASS INDEX: 24.13 KG/M2 | RESPIRATION RATE: 16 BRPM | OXYGEN SATURATION: 98 %

## 2017-12-21 DIAGNOSIS — Z23 ENCOUNTER FOR IMMUNIZATION: ICD-10-CM

## 2017-12-21 DIAGNOSIS — Z00.00 WELL ADULT EXAM: Primary | ICD-10-CM

## 2017-12-21 DIAGNOSIS — M85.80 OSTEOPENIA, UNSPECIFIED LOCATION: ICD-10-CM

## 2017-12-21 NOTE — PROGRESS NOTES
Subjective:     Chief Complaint   Patient presents with    Hand Pain     f/u better     She  is a 81y.o. year old female who presents for evaluation. Follow up on right hand which has taken 18 months to resolve. Wants a physical soon. Historical Data    Past Medical History:   Diagnosis Date    Arthritis     spine    Ductal carcinoma in situ (DCIS) of right breast 2009    lumpectomy x2, XRT (followed by Dr. Chris Mims)   Doug Morejon History of colon polyps     Osteopenia 09/2016    mild, R fem neck -1.0        Past Surgical History:   Procedure Laterality Date    HX BLADDER SUSPENSION      HX BREAST LUMPECTOMY      x2 (right)    HX COLONOSCOPY  2010    normal (but has had a h/o colon polyps), repeat q5 yrs, Dr. Ros Poole Prescriptions as of 12/21/2017   Medication Sig Dispense Refill    calcium carbonate (TUMS) 200 mg calcium (500 mg) Chew Take 1 Tab by mouth daily. 30 Tab 3     No facility-administered encounter medications on file as of 12/21/2017. Allergies   Allergen Reactions    Penicillins Anaphylaxis        Social History     Social History    Marital status: SINGLE     Spouse name: N/A    Number of children: N/A    Years of education: N/A     Occupational History    Not on file. Social History Main Topics    Smoking status: Never Smoker    Smokeless tobacco: Never Used    Alcohol use No    Drug use: No    Sexual activity: Not Currently     Other Topics Concern    Not on file     Social History Narrative    ** Merged History Encounter **             Review of Systems  Pertinent items are noted in HPI. Objective:     Vitals:    12/21/17 1335   BP: 130/80   Pulse: 90   Resp: 16   Temp: 97.3 °F (36.3 °C)   TempSrc: Oral   SpO2: 98%   Weight: 136 lb 3.2 oz (61.8 kg)   Height: 5' 3\" (1.6 m)     Pleasant WF in no acute distress. Hands: Lacks a minimal amount of extension in the left wrist.  Cardiac: RRR without murmurs, gallops or rubs.   Lungs: Clear to auscultation. ASSESSMENT / PLAN:   1. Well adult exam    - CBC WITH AUTOMATED DIFF; Future  - LIPID PANEL; Future  - TSH REFLEX TO T4; Future  - URINALYSIS W/ RFLX MICROSCOPIC; Future  - METABOLIC PANEL, COMPREHENSIVE; Future  - VITAMIN D, 25 HYROXY PANEL; Future    2. Osteopenia, unspecified location    - VITAMIN D, 25 HYROXY PANEL; Future    3. Encounter for immunization    - varicella-zoster recombinant, PF, (SHINGRIX, PF,) 50 mcg/0.5 mL susr injection; 0.5 mL by IntraMUSCular route once for 1 dose. Indications: PREVENTION OF HERPES ZOSTER  Dispense: 0.5 mL; Refill: 0             Follow-up Disposition:  Return in about 3 months (around 3/21/2018) for Physical.   Advised her to call back or return to office if symptoms worsen/change/persist.  Discussed expected course/resolution/complications of diagnosis in detail with patient. Medication risks/benefits/costs/interactions/alternatives discussed with patient. She was given an after visit summary which includes diagnoses, current medications, & vitals. She expressed understanding with the diagnosis and plan.

## 2017-12-21 NOTE — MR AVS SNAPSHOT
Visit Information Date & Time Provider Department Dept. Phone Encounter #  
 12/21/2017  1:30 PM Mikhail Quiñones MD [facility] [phone] Follow-up Instructions Return in about 3 months (around 3/21/2018) for Physical.  
  
Upcoming Health Maintenance Date Due ZOSTER VACCINE AGE 60> 11/11/2006 COLONOSCOPY 10/1/2015 GLAUCOMA SCREENING Q2Y 1/1/2017 MEDICARE YEARLY EXAM 8/19/2018 DTaP/Tdap/Td series (2 - Td) 10/18/2021 Allergies as of 12/21/2017  Review Complete On: 12/21/2017 By: Mikhail Quiñones MD  
  
 Severity Noted Reaction Type Reactions Penicillins  07/18/2011    Anaphylaxis Current Immunizations  Reviewed on 12/21/2017 Name Date Influenza Vaccine 10/1/2017, 10/1/2016, 10/9/2013 Pneumococcal Conjugate (PCV-13) 11/11/2016 Tdap 10/18/2011 ZZZ-RETIRED (DO NOT USE) Pneumococcal Vaccine (Unspecified Type) 9/27/2012 Reviewed by Glenn Gonzales LPN on 98/18/2324 at  1:34 PM  
You Were Diagnosed With   
  
 Codes Comments Well adult exam    -  Primary ICD-10-CM: Z00.00 ICD-9-CM: V70.0 Osteopenia, unspecified location     ICD-10-CM: M85.80 ICD-9-CM: 733.90 Encounter for immunization     ICD-10-CM: H08 ICD-9-CM: V03.89 Vitals BP Pulse Temp Resp Height(growth percentile) Weight(growth percentile) 130/80 (BP 1 Location: Left arm, BP Patient Position: Sitting) 90 97.3 °F (36.3 °C) (Oral) 16 5' 3\" (1.6 m) 136 lb 3.2 oz (61.8 kg) LMP SpO2 BMI OB Status Smoking Status (Exact Date) 98% 24.13 kg/m2 Hysterectomy Never Smoker BMI and BSA Data Body Mass Index Body Surface Area  
 24.13 kg/m 2 1.66 m 2 Preferred Pharmacy Pharmacy Name Phone [id number] [place], [address] [place]an [phone] Your Updated Medication List  
  
   
This list is accurate as of: 12/21/17  1:59 PM.  Always use your most recent med list.  
  
  
  
  
 calcium carbonate 200 mg calcium (500 mg) Chew Commonly known as:  TUMS Take 1 Tab by mouth daily. varicella-zoster recombinant (PF) 50 mcg/0.5 mL Susr injection Commonly known as:  SHINGRIX (PF)  
0.5 mL by IntraMUSCular route once for 1 dose. Indications: PREVENTION OF HERPES ZOSTER Prescriptions Printed Refills  
 varicella-zoster recombinant, PF, (SHINGRIX, PF,) 50 mcg/0.5 mL susr injection 0 Si.5 mL by IntraMUSCular route once for 1 dose. Indications: PREVENTION OF HERPES ZOSTER Class: Print Route: IntraMUSCular Follow-up Instructions Return in about 3 months (around 3/21/2018) for Physical.  
  
To-Do List   
 2018 Lab:  CBC WITH AUTOMATED DIFF   
  
 2018 Lab:  LIPID PANEL   
  
 2018 Lab:  METABOLIC PANEL, COMPREHENSIVE   
  
 2018 Lab:  TSH REFLEX TO T4   
  
 2018 Lab:  URINALYSIS W/ RFLX MICROSCOPIC   
  
 2018 Lab:  VITAMIN D, 25 HYROXY PANEL Patient Instructions Follow a Mediterranean diet. Stay physically active. Have a shingles shot at your pharmacy. Introducing [facility] & HEALTH SERVICES! [facility] introduces [employer] patient portal. Now you can access parts of your medical record, email your doctor's office, and request medication refills online. 1. In your internet browser, go to [email or web address]. [facility]/[place] 2. Click on the First Time User? Click Here link in the Sign In box. You will see the New Member Sign Up page. 3. Enter your [employer] Access Code exactly as it appears below. You will not need to use this code after youve completed the sign-up process. If you do not sign up before the expiration date, you must request a new code. · [employer] Access Code: [email or web address] Expires: 2018 11:12 AM 
 
4. Enter the last four digits of your Social Security Number (xxxx) and Date of Birth (mm/dd/yyyy) as indicated and click Submit.  You will be taken to the next sign-up page. 5. Create a Hopper ID. This will be your Hopper login ID and cannot be changed, so think of one that is secure and easy to remember. 6. Create a Hopper password. You can change your password at any time. 7. Enter your Password Reset Question and Answer. This can be used at a later time if you forget your password. 8. Enter your e-mail address. You will receive e-mail notification when new information is available in [address] 9. Click Sign Up. You can now view and download portions of your medical record. 10. Click the Download Summary menu link to download a portable copy of your medical information. If you have questions, please visit the Frequently Asked Questions section of the [employer] website. Remember, [employer] is NOT to be used for urgent needs. For medical emergencies, dial 911. Now available from your iPhone and Android! Please provide this summary of care documentation to your next provider. Your primary care clinician is listed as Mikhail Quiñones. If you have any questions after today's visit, please call [phone].

## 2017-12-21 NOTE — PROGRESS NOTES
Chief Complaint   Patient presents with    Hand Pain     f/u better     Reviewed record in preparation for visit and have obtained necessary documentation. Identified pt with two pt identifiers(name and ). Health Maintenance Due   Topic    ZOSTER VACCINE AGE 60>     COLONOSCOPY     GLAUCOMA SCREENING Q2Y          Chief Complaint   Patient presents with    Hand Pain     f/u better        Wt Readings from Last 3 Encounters:   17 136 lb 3.2 oz (61.8 kg)   10/24/17 133 lb 4.8 oz (60.5 kg)   17 132 lb (59.9 kg)     Temp Readings from Last 3 Encounters:   17 97.3 °F (36.3 °C) (Oral)   10/24/17 96.5 °F (35.8 °C) (Oral)   17 97.1 °F (36.2 °C) (Oral)     BP Readings from Last 3 Encounters:   17 130/80   10/24/17 110/70   17 120/70     Pulse Readings from Last 3 Encounters:   17 90   10/24/17 66   17 69           Learning Assessment:  :     Learning Assessment 2017 3/6/2014   PRIMARY LEARNER Patient Patient   HIGHEST LEVEL OF EDUCATION - PRIMARY LEARNER  4 YEARS OF COLLEGE 4 YEARS OF COLLEGE   BARRIERS PRIMARY LEARNER NONE NONE   CO-LEARNER CAREGIVER No No   PRIMARY LANGUAGE ENGLISH ENGLISH    NEED - No   LEARNER PREFERENCE PRIMARY DEMONSTRATION LISTENING     LISTENING READING     READING DEMONSTRATION   LEARNING SPECIAL TOPICS - no   ANSWERED BY patient patient   RELATIONSHIP SELF SELF   ASSESSMENT COMMENT - none       Depression Screening:  :     PHQ over the last two weeks 2017   Little interest or pleasure in doing things Not at all   Feeling down, depressed or hopeless Not at all   Total Score PHQ 2 0       Fall Risk Assessment:  :     Fall Risk Assessment, last 12 mths 2017   Able to walk? Yes   Fall in past 12 months? No   Fall with injury? -   Number of falls in past 12 months -   Fall Risk Score -       Abuse Screening:  :     Abuse Screening Questionnaire 2017 2017 3/6/2014   Do you ever feel afraid of your partner?  Ginger Simmons N N   Are you in a relationship with someone who physically or mentally threatens you? N N N   Is it safe for you to go home? Y Y Y       Coordination of Care Questionnaire:  :     1) Have you been to an emergency room, urgent care clinic since your last visit? no   Hospitalized since your last visit? no             2) Have you seen or consulted any other health care providers outside of [facility] since your last visit? no  (Include any pap smears or colon screenings in this section.)    3) Do you have an Advance Directive on file? no    4) Are you interested in receiving information on Advance Directives? NO      Patient is accompanied by self I have received verbal consent from Natali Gonzalez to discuss any/all medical information while they are present in the room. Reviewed record  In preparation for visit and have obtained necessary documentation.

## 2018-02-22 ENCOUNTER — HOSPITAL ENCOUNTER (OUTPATIENT)
Dept: LAB | Age: 73
Discharge: HOME | End: 2018-02-22
Payer: MEDICARE

## 2018-02-22 PROCEDURE — 85025 COMPLETE CBC W/AUTO DIFF WBC: CPT

## 2018-02-22 PROCEDURE — 80061 LIPID PANEL: CPT

## 2018-02-22 PROCEDURE — 80053 COMPREHEN METABOLIC PANEL: CPT

## 2018-02-22 PROCEDURE — 81003 URINALYSIS AUTO W/O SCOPE: CPT

## 2018-02-22 PROCEDURE — 36415 COLL VENOUS BLD VENIPUNCTURE: CPT

## 2018-02-22 PROCEDURE — 82306 VITAMIN D 25 HYDROXY: CPT

## 2018-02-22 PROCEDURE — 84443 ASSAY THYROID STIM HORMONE: CPT

## 2018-02-26 LAB
25(OH)D2 SERPL-MCNC: <1 NG/ML
25(OH)D3 SERPL-MCNC: 29 NG/ML
25(OH)D3+25(OH)D2 SERPL-MCNC: 30 NG/ML
ALBUMIN SERPL-MCNC: 4.4 G/DL (ref 3.5–4.8)
ALBUMIN/GLOB SERPL: 2.2 {RATIO} (ref 1.2–2.2)
ALP SERPL-CCNC: 56 IU/L (ref 39–117)
ALT SERPL-CCNC: 19 IU/L (ref 0–32)
APPEARANCE UR: CLEAR
AST SERPL-CCNC: 25 IU/L (ref 0–40)
BASOPHILS # BLD AUTO: 0 X10E3/UL (ref 0–0.2)
BASOPHILS NFR BLD AUTO: 1 %
BILIRUB SERPL-MCNC: 1.1 MG/DL (ref 0–1.2)
BILIRUB UR QL STRIP: NEGATIVE
BUN SERPL-MCNC: 17 MG/DL (ref 8–27)
BUN/CREAT SERPL: 18 (ref 12–28)
CALCIUM SERPL-MCNC: 9.6 MG/DL (ref 8.7–10.3)
CHLORIDE SERPL-SCNC: 101 MMOL/L (ref 96–106)
CHOLEST SERPL-MCNC: 202 MG/DL (ref 100–199)
CO2 SERPL-SCNC: 24 MMOL/L (ref 18–29)
COLOR UR: YELLOW
CREAT SERPL-MCNC: 0.94 MG/DL (ref 0.57–1)
EOSINOPHIL # BLD AUTO: 0.1 X10E3/UL (ref 0–0.4)
EOSINOPHIL NFR BLD AUTO: 4 %
ERYTHROCYTE [DISTWIDTH] IN BLOOD BY AUTOMATED COUNT: 14.1 % (ref 12.3–15.4)
GFR SERPLBLD CREATININE-BSD FMLA CKD-EPI: 61 ML/MIN/1.73
GFR SERPLBLD CREATININE-BSD FMLA CKD-EPI: 71 ML/MIN/1.73
GLOBULIN SER CALC-MCNC: 2 G/DL (ref 1.5–4.5)
GLUCOSE SERPL-MCNC: 90 MG/DL (ref 65–99)
GLUCOSE UR QL: NEGATIVE
HCT VFR BLD AUTO: 41.3 % (ref 34–46.6)
HDLC SERPL-MCNC: 71 MG/DL
HGB BLD-MCNC: 14.1 G/DL (ref 11.1–15.9)
HGB UR QL STRIP: NEGATIVE
IMM GRANULOCYTES # BLD: 0 X10E3/UL (ref 0–0.1)
IMM GRANULOCYTES NFR BLD: 0 %
INTERPRETATION, 910389: NORMAL
KETONES UR QL STRIP: NEGATIVE
LDLC SERPL CALC-MCNC: 114 MG/DL (ref 0–99)
LEUKOCYTE ESTERASE UR QL STRIP: NEGATIVE
LYMPHOCYTES # BLD AUTO: 1 X10E3/UL (ref 0.7–3.1)
LYMPHOCYTES NFR BLD AUTO: 29 %
MCH RBC QN AUTO: 28 PG (ref 26.6–33)
MCHC RBC AUTO-ENTMCNC: 34.1 G/DL (ref 31.5–35.7)
MCV RBC AUTO: 82 FL (ref 79–97)
MICRO URNS: NORMAL
MONOCYTES # BLD AUTO: 0.3 X10E3/UL (ref 0.1–0.9)
MONOCYTES NFR BLD AUTO: 10 %
NEUTROPHILS # BLD AUTO: 1.8 X10E3/UL (ref 1.4–7)
NEUTROPHILS NFR BLD AUTO: 56 %
NITRITE UR QL STRIP: NEGATIVE
PH UR STRIP: 5.5 [PH] (ref 5–7.5)
PLATELET # BLD AUTO: 204 X10E3/UL (ref 150–379)
POTASSIUM SERPL-SCNC: 4.4 MMOL/L (ref 3.5–5.2)
PROT SERPL-MCNC: 6.4 G/DL (ref 6–8.5)
PROT UR QL STRIP: NEGATIVE
RBC # BLD AUTO: 5.03 X10E6/UL (ref 3.77–5.28)
SODIUM SERPL-SCNC: 140 MMOL/L (ref 134–144)
SP GR UR: 1.02 (ref 1–1.03)
TRIGL SERPL-MCNC: 85 MG/DL (ref 0–149)
TSH SERPL DL<=0.005 MIU/L-ACNC: 1.5 UIU/ML (ref 0.45–4.5)
UROBILINOGEN UR STRIP-MCNC: 0.2 MG/DL (ref 0.2–1)
VLDLC SERPL CALC-MCNC: 17 MG/DL (ref 5–40)
WBC # BLD AUTO: 3.3 X10E3/UL (ref 3.4–10.8)

## 2018-03-22 ENCOUNTER — OFFICE VISIT (OUTPATIENT)
Dept: INTERNAL MEDICINE CLINIC | Age: 73
End: 2018-03-22

## 2018-03-22 VITALS
OXYGEN SATURATION: 97 % | HEIGHT: 63 IN | HEART RATE: 70 BPM | DIASTOLIC BLOOD PRESSURE: 70 MMHG | SYSTOLIC BLOOD PRESSURE: 110 MMHG | BODY MASS INDEX: 24.27 KG/M2 | RESPIRATION RATE: 14 BRPM | TEMPERATURE: 97 F | WEIGHT: 137 LBS

## 2018-03-22 DIAGNOSIS — M54.32 SCIATICA OF LEFT SIDE: ICD-10-CM

## 2018-03-22 DIAGNOSIS — Z00.00 WELL ADULT EXAM: Primary | ICD-10-CM

## 2018-03-22 DIAGNOSIS — Z86.010 HX OF COLONIC POLYPS: ICD-10-CM

## 2018-03-22 DIAGNOSIS — M85.80 OSTEOPENIA, UNSPECIFIED LOCATION: ICD-10-CM

## 2018-03-22 NOTE — PATIENT INSTRUCTIONS
Follow a Mediterranean style diet. Stay physically active. Regular eye exams are recommended. Have a mammogram.  You have declined further colonoscopy evaluations. Get your shingles vaccine. Learning About the [address] Diet  What is the Mediterranean diet? The Mediterranean diet is a style of eating rather than a diet plan. It features foods eaten in [place], [place], [place] and [place], and other countries along the [facility]. It emphasizes eating foods like fish, fruits, vegetables, beans, high-fiber breads and whole grains, nuts, and olive oil. This style of eating includes limited red meat, cheese, and sweets. Why choose the Mediterranean diet? A Mediterranean-style diet may improve heart health. It contains more fat than other heart-healthy diets. But the fats are mainly from nuts, unsaturated oils (such as fish oils and olive oil), and certain nut or seed oils (such as canola, soybean, or flaxseed oil). These fats may help protect the heart and blood vessels. How can you get started on the Mediterranean diet? Here are some things you can do to switch to a more Mediterranean way of eating. What to eat  · Eat a variety of fruits and vegetables each day, such as grapes, blueberries, tomatoes, broccoli, peppers, figs, olives, spinach, eggplant, beans, lentils, and chickpeas. · Eat a variety of whole-grain foods each day, such as oats, brown rice, and whole wheat bread, pasta, and couscous. · Eat fish at least 2 times a week. Try tuna, salmon, mackerel, lake trout, herring, or sardines. · Eat moderate amounts of low-fat dairy products, such as milk, cheese, or yogurt. · Eat moderate amounts of poultry and eggs. · Choose healthy (unsaturated) fats, such as nuts, olive oil, and certain nut or seed oils like canola, soybean, and flaxseed. · Limit unhealthy (saturated) fats, such as butter, palm oil, and coconut oil.  And limit fats found in animal products, such as meat and dairy products made with whole milk. Try to eat red meat only a few times a month in very small amounts. · Limit sweets and desserts to only a few times a week. This includes sugar-sweetened drinks like soda. The Mediterranean diet may also include red wine with your meal-1 glass each day for women and up to 2 glasses a day for men. Tips for eating at home  · Use herbs, spices, garlic, lemon zest, and citrus juice instead of salt to add flavor to foods. · Add avocado slices to your sandwich instead of cohen. · Have fish for lunch or dinner instead of red meat. Brush the fish with olive oil, and broil or grill it. · Sprinkle your salad with seeds or nuts instead of cheese. · Cook with olive or canola oil instead of butter or oils that are high in saturated fat. · Switch from 2% milk or whole milk to 1% or fat-free milk. · Dip raw vegetables in a vinaigrette dressing or hummus instead of dips made from mayonnaise or sour cream.  · Have a piece of fruit for dessert instead of a piece of cake. Try baked apples, or have some dried fruit. Tips for eating out  · Try broiled, grilled, baked, or poached fish instead of having it fried or breaded. · Ask your  to have your meals prepared with olive oil instead of butter. · Order dishes made with marinara sauce or sauces made from olive oil. Avoid sauces made from cream or mayonnaise. · Choose whole-grain breads, whole wheat pasta and pizza crust, brown rice, beans, and lentils. · Cut back on butter or margarine on bread. Instead, you can dip your bread in a small amount of olive oil. · Ask for a side salad or grilled vegetables instead of french fries or chips. Where can you learn more? Go to http://lucas-sean.info/. Enter 269-916-4543 in the search box to learn more about \"Learning About the Mediterranean Diet. \"  Current as of: May 12, 2017  Content Version: 11.4  © 5909-5496 [employer].  Care instructions adapted under license by [address] (which disclaims liability or warranty for this information). If you have questions about a medical condition or this instruction, always ask your healthcare professional. [address] any warranty or liability for your use of this information.

## 2018-03-22 NOTE — MR AVS SNAPSHOT
[address], [address] [place] [address] 
[phone] Patient: Robert Hobson MRN: [id number] WAE:[birth year] Visit Information Date & Time Provider Department Dept. Phone Encounter #  
 3/22/2018  8:30 AM Lynne Gonzalez MD [place] [id number] [facility] [phone] Follow-up Instructions Return in about 1 year (around 3/22/2019) for Physical.  
  
Upcoming Health Maintenance Date Due ZOSTER VACCINE AGE 60> 11/11/2006 COLONOSCOPY 10/1/2015 BREAST CANCER SCRN MAMMOGRAM 4/30/2018* GLAUCOMA SCREENING Q2Y 5/31/2018* MEDICARE YEARLY EXAM 8/19/2018 DTaP/Tdap/Td series (2 - Td) 10/18/2021 *Topic was postponed. The date shown is not the original due date. Allergies as of 3/22/2018  Review Complete On: 3/22/2018 By: Lynne Gonzalez MD  
  
 Severity Noted Reaction Type Reactions Penicillins  07/18/2011    Anaphylaxis Current Immunizations  Reviewed on 12/21/2017 Name Date Influenza Vaccine 10/1/2017, 10/1/2016, 10/9/2013 Pneumococcal Conjugate (PCV-13) 11/11/2016 Tdap 10/18/2011 ZZZ-RETIRED (DO NOT USE) Pneumococcal Vaccine (Unspecified Type) 9/27/2012 Not reviewed this visit You Were Diagnosed With   
  
 Codes Comments Well adult exam    -  Primary ICD-10-CM: Z00.00 ICD-9-CM: V70.0 Osteopenia, unspecified location     ICD-10-CM: M85.80 ICD-9-CM: 733.90 Hx of colonic polyps     ICD-10-CM: Z86.010 
ICD-9-CM: V12.72 Sciatica of left side     ICD-10-CM: M54.32 
ICD-9-CM: 724.3 Vitals BP Pulse Temp Resp Height(growth percentile) Weight(growth percentile) 110/70 (BP 1 Location: Left arm, BP Patient Position: Sitting) 70 97 °F (36.1 °C) (Oral) 14 5' 3\" (1.6 m) 137 lb (62.1 kg) SpO2 BMI OB Status Smoking Status 97% 24.27 kg/m2 Hysterectomy Never Smoker Vitals History BMI and BSA Data Body Mass Index Body Surface Area 24.27 kg/m 2 1.66 m 2 Preferred Pharmacy Pharmacy Name Phone [id number] [address], [address] [place] [phone] Your Updated Medication List  
  
   
This list is accurate as of 3/22/18  8:53 AM.  Always use your most recent med list.  
  
  
  
  
 calcium carbonate 200 mg calcium (500 mg) Bayron Arias Commonly known as:  TUMS Take 1 Tab by mouth daily. Follow-up Instructions Return in about 1 year (around 3/22/2019) for Physical.  
  
  
Patient Instructions Follow a Mediterranean style diet. Stay physically active. Regular eye exams are recommended. Have a mammogram. 
You have declined further colonoscopy evaluations. Get your shingles vaccine. Learning About the [address] Diet What is the Mediterranean diet? The Mediterranean diet is a style of eating rather than a diet plan. It features foods eaten in [place], [place], [place] and [place], and other countries along the [facility]. It emphasizes eating foods like fish, fruits, vegetables, beans, high-fiber breads and whole grains, nuts, and olive oil. This style of eating includes limited red meat, cheese, and sweets. Why choose the Mediterranean diet? A Mediterranean-style diet may improve heart health. It contains more fat than other heart-healthy diets. But the fats are mainly from nuts, unsaturated oils (such as fish oils and olive oil), and certain nut or seed oils (such as canola, soybean, or flaxseed oil). These fats may help protect the heart and blood vessels. How can you get started on the Mediterranean diet? Here are some things you can do to switch to a more Mediterranean way of eating. What to eat · Eat a variety of fruits and vegetables each day, such as grapes, blueberries, tomatoes, broccoli, peppers, figs, olives, spinach, eggplant, beans, lentils, and chickpeas.  
· Eat a variety of whole-grain foods each day, such as oats, brown rice, and whole wheat bread, pasta, and couscous. · Eat fish at least 2 times a week. Try tuna, salmon, mackerel, lake trout, herring, or sardines. · Eat moderate amounts of low-fat dairy products, such as milk, cheese, or yogurt. · Eat moderate amounts of poultry and eggs. · Choose healthy (unsaturated) fats, such as nuts, olive oil, and certain nut or seed oils like canola, soybean, and flaxseed. · Limit unhealthy (saturated) fats, such as butter, palm oil, and coconut oil. And limit fats found in animal products, such as meat and dairy products made with whole milk. Try to eat red meat only a few times a month in very small amounts. · Limit sweets and desserts to only a few times a week. This includes sugar-sweetened drinks like soda. The Mediterranean diet may also include red wine with your meal-1 glass each day for women and up to 2 glasses a day for men. Tips for eating at home · Use herbs, spices, garlic, lemon zest, and citrus juice instead of salt to add flavor to foods. · Add avocado slices to your sandwich instead of cohen. · Have fish for lunch or dinner instead of red meat. Brush the fish with olive oil, and broil or grill it. · Sprinkle your salad with seeds or nuts instead of cheese. · Cook with olive or canola oil instead of butter or oils that are high in saturated fat. · Switch from 2% milk or whole milk to 1% or fat-free milk. · Dip raw vegetables in a vinaigrette dressing or hummus instead of dips made from mayonnaise or sour cream. 
· Have a piece of fruit for dessert instead of a piece of cake. Try baked apples, or have some dried fruit. Tips for eating out · Try broiled, grilled, baked, or poached fish instead of having it fried or breaded. · Ask your  to have your meals prepared with olive oil instead of butter. · Order dishes made with marinara sauce or sauces made from olive oil. Avoid sauces made from cream or mayonnaise. · Choose whole-grain breads, whole wheat pasta and pizza crust, brown rice, beans, and lentils. · Cut back on butter or margarine on bread. Instead, you can dip your bread in a small amount of olive oil. · Ask for a side salad or grilled vegetables instead of french fries or chips. Where can you learn more? Go to http://lucas-sean.info/. Enter 261-452-5842 in the search box to learn more about \"Learning About the Mediterranean Diet. \" Current as of: May 12, 2017 Content Version: 11.4 © 1025-9631 [employer]. Care instructions adapted under license by [employer] (which disclaims liability or warranty for this information). If you have questions about a medical condition or this instruction, always ask your healthcare professional. [address] any warranty or liability for your use of this information. Introducing [facility] & HEALTH SERVICES! [address] [facility] introduces [employer] patient portal. Now you can access parts of your medical record, email your doctor's office, and request medication refills online. 1. In your internet browser, go to [email or web address] 2. Click on the First Time User? Click Here link in the Sign In box. You will see the New Member Sign Up page. 3. Enter your [employer] Access Code exactly as it appears below. You will not need to use this code after youve completed the sign-up process. If you do not sign up before the expiration date, you must request a new code. · [employer] Access Code: [email or web address] Expires: 6/20/2018  8:21 AM 
 
4. Enter the last four digits of your Social Security Number (xxxx) and Date of Birth (mm/dd/yyyy) as indicated and click Submit. You will be taken to the next sign-up page. 5. Create a JDF ID. This will be your [employer]DF login ID and cannot be changed, so think of one that is secure and easy to remember. 6. Create a [employer] password. You can change your password at any time. 7. Enter your Password Reset Question and Answer. This can be used at a later time if you forget your password. 8. Enter your e-mail address. You will receive e-mail notification when new information is available in [address] 9. Click Sign Up. You can now view and download portions of your medical record. 10. Click the Download Summary menu link to download a portable copy of your medical information. If you have questions, please visit the Frequently Asked Questions section of the [employer] website. Remember, [employer] is NOT to be used for urgent needs. For medical emergencies, dial 911. Now available from your iPhone and Android! Please provide this summary of care documentation to your next provider. Your primary care clinician is listed as David Franks. If you have any questions after today's visit, please call [phone].

## 2018-03-22 NOTE — PROGRESS NOTES
Subjective:     Chief Complaint   Patient presents with    Physical     routine care     She  is a 72y.o. year old female who presents for evaluation. Gets some pain in left deltoid region when she walks. If support elbow, doesn't get it. Refuses any further colonoscopy exams. Has mammogram ane eye exams scheduled. Historical Data    Past Medical History:   Diagnosis Date    Arthritis     spine    Ductal carcinoma in situ (DCIS) of right breast 2009    lumpectomy x2, XRT (followed by Dr. Abigail Bernheim)   [facility] History of colon polyps     Osteopenia 09/2016    mild, R fem neck -1.0        Past Surgical History:   Procedure Laterality Date    HX BLADDER SUSPENSION      HX BREAST LUMPECTOMY      x2 (right)    HX COLONOSCOPY  2010    normal (but has had a h/o colon polyps), repeat q5 yrs, Dr. Randi Eaton Prescriptions as of 3/22/2018   Medication Sig Dispense Refill    calcium carbonate (TUMS) 200 mg calcium (500 mg) Chew Take 1 Tab by mouth daily. 30 Tab 3     No facility-administered encounter medications on file as of 3/22/2018. Allergies   Allergen Reactions    Penicillins Anaphylaxis        Social History     Social History    Marital status: SINGLE     Spouse name: N/A    Number of children: N/A    Years of education: N/A     Occupational History    Not on file. Social History Main Topics    Smoking status: Never Smoker    Smokeless tobacco: Never Used    Alcohol use No    Drug use: No    Sexual activity: Not Currently     Other Topics Concern    Not on file     Social History Narrative    ** Merged History Encounter **             Review of Systems  A comprehensive review of systems was negative except for that written in the HPI. Objective:     Vitals:    03/22/18 0827   BP: 110/70   Pulse: 70   Resp: 14   Temp: 97 °F (36.1 °C)   TempSrc: Oral   SpO2: 97%   Weight: 137 lb (62.1 kg)   Height: 5' 3\" (1.6 m)     Skin: Warm with normal turgor.  No macules, papules or stria. Pale, type I skin. HEENT:    Head:  Normocephalic, atraumatic   Ears:  Canals are clear. TM's are intact. Eyes:  PERRLA. EOMI. Throat: Clear. No exudates. Neck: Supple. No masses. No adenopathy. Cardiac: Regular rate and rhythm. No murmurs, gallops or rubs. Lungs: Clear to ausculation. No wheezes, rhonchi or rales. Abdomen: Soft and non-tender. No HSM. Extremities: Pulses intact. No swelling. Musculoskeletal: No joint effusions. Neurologic:    CN's:  II-XII intact   Sensory:  Intact to position sense, two point discrimination, sharp and dull discrimination, and 10 gm monofilament. Motor:  Symmetric and full. Reflexes:  Symmetric and full. Cerebellum:  No dysdiadokinesis. ASSESSMENT / PLAN:   1. Well adult exam  · Mediterranean diet. · Exercise. 2. Osteopenia, unspecified location  · Weight bearing exercise / calcium    3. Hx of colonic polyps  · Patient refuses further colonoscopies. 4. Sciatica of left side  · Patient successful at treating with Yoga    Patient Instructions   Follow a Mediterranean style diet. Stay physically active. Regular eye exams are recommended. Have a mammogram.  You have declined further colonoscopy evaluations. Get your shingles vaccine. Learning About the [address] Diet  What is the Mediterranean diet? The Mediterranean diet is a style of eating rather than a diet plan. It features foods eaten in [place], [place], [place] and [place], and other countries along the [facility]. It emphasizes eating foods like fish, fruits, vegetables, beans, high-fiber breads and whole grains, nuts, and olive oil. This style of eating includes limited red meat, cheese, and sweets. Why choose the Mediterranean diet? A Mediterranean-style diet may improve heart health. It contains more fat than other heart-healthy diets.  But the fats are mainly from nuts, unsaturated oils (such as fish oils and olive oil), and certain nut or seed oils (such as canola, soybean, or flaxseed oil). These fats may help protect the heart and blood vessels. How can you get started on the Mediterranean diet? Here are some things you can do to switch to a more Mediterranean way of eating. What to eat  · Eat a variety of fruits and vegetables each day, such as grapes, blueberries, tomatoes, broccoli, peppers, figs, olives, spinach, eggplant, beans, lentils, and chickpeas. · Eat a variety of whole-grain foods each day, such as oats, brown rice, and whole wheat bread, pasta, and couscous. · Eat fish at least 2 times a week. Try tuna, salmon, mackerel, lake trout, herring, or sardines. · Eat moderate amounts of low-fat dairy products, such as milk, cheese, or yogurt. · Eat moderate amounts of poultry and eggs. · Choose healthy (unsaturated) fats, such as nuts, olive oil, and certain nut or seed oils like canola, soybean, and flaxseed. · Limit unhealthy (saturated) fats, such as butter, palm oil, and coconut oil. And limit fats found in animal products, such as meat and dairy products made with whole milk. Try to eat red meat only a few times a month in very small amounts. · Limit sweets and desserts to only a few times a week. This includes sugar-sweetened drinks like soda. The Mediterranean diet may also include red wine with your meal-1 glass each day for women and up to 2 glasses a day for men. Tips for eating at home  · Use herbs, spices, garlic, lemon zest, and citrus juice instead of salt to add flavor to foods. · Add avocado slices to your sandwich instead of cohen. · Have fish for lunch or dinner instead of red meat. Brush the fish with olive oil, and broil or grill it. · Sprinkle your salad with seeds or nuts instead of cheese. · Cook with olive or canola oil instead of butter or oils that are high in saturated fat. · Switch from 2% milk or whole milk to 1% or fat-free milk.   · Dip raw vegetables in a vinaigrette dressing or hummus instead of dips made from mayonnaise or sour cream.  · Have a piece of fruit for dessert instead of a piece of cake. Try baked apples, or have some dried fruit. Tips for eating out  · Try broiled, grilled, baked, or poached fish instead of having it fried or breaded. · Ask your  to have your meals prepared with olive oil instead of butter. · Order dishes made with marinara sauce or sauces made from olive oil. Avoid sauces made from cream or mayonnaise. · Choose whole-grain breads, whole wheat pasta and pizza crust, brown rice, beans, and lentils. · Cut back on butter or margarine on bread. Instead, you can dip your bread in a small amount of olive oil. · Ask for a side salad or grilled vegetables instead of french fries or chips. Where can you learn more? Go to http://lucasSocialscopesean.info/. Enter 315-454-9385 in the search box to learn more about \"Learning About the Mediterranean Diet. \"  Current as of: May 12, 2017  Content Version: 11.4  © 5401-7570 [employer]. Care instructions adapted under license by [employer] (which disclaims liability or warranty for this information). If you have questions about a medical condition or this instruction, always ask your healthcare professional. [place] [address] any warranty or liability for your use of this information. Follow-up Disposition:  Return in about 1 year (around 3/22/2019) for Physical.   Advised her to call back or return to office if symptoms worsen/change/persist.  Discussed expected course/resolution/complications of diagnosis in detail with patient. Medication risks/benefits/costs/interactions/alternatives discussed with patient. She was given an after visit summary which includes diagnoses, current medications, & vitals. She expressed understanding with the diagnosis and plan.

## 2018-03-22 NOTE — PROGRESS NOTES
Chief Complaint   Patient presents with    Physical     routine care     Reviewed record in preparation for visit and have obtained necessary documentation. Identified pt with two pt identifiers(name and ). Health Maintenance Due   Topic    ZOSTER VACCINE AGE 60>     COLONOSCOPY     GLAUCOMA SCREENING Q2Y     BREAST CANCER SCRN MAMMOGRAM          Chief Complaint   Patient presents with    Physical     routine care        Wt Readings from Last 3 Encounters:   18 137 lb (62.1 kg)   17 136 lb 3.2 oz (61.8 kg)   10/24/17 133 lb 4.8 oz (60.5 kg)     Temp Readings from Last 3 Encounters:   18 97 °F (36.1 °C) (Oral)   17 97.3 °F (36.3 °C) (Oral)   10/24/17 96.5 °F (35.8 °C) (Oral)     BP Readings from Last 3 Encounters:   18 110/70   17 130/80   10/24/17 110/70     Pulse Readings from Last 3 Encounters:   18 70   17 90   10/24/17 66           Learning Assessment:  :     Learning Assessment 2017 3/6/2014   PRIMARY LEARNER Patient Patient   HIGHEST LEVEL OF EDUCATION - PRIMARY LEARNER  4 YEARS OF COLLEGE 4 YEARS OF COLLEGE   BARRIERS PRIMARY LEARNER NONE NONE   CO-LEARNER CAREGIVER No No   PRIMARY LANGUAGE ENGLISH ENGLISH    NEED - No   LEARNER PREFERENCE PRIMARY DEMONSTRATION LISTENING     LISTENING READING     READING DEMONSTRATION   LEARNING SPECIAL TOPICS - no   ANSWERED BY patient patient   RELATIONSHIP SELF SELF   ASSESSMENT COMMENT - none       Depression Screening:  :     PHQ over the last two weeks 2017   Little interest or pleasure in doing things Not at all   Feeling down, depressed or hopeless Not at all   Total Score PHQ 2 0       Fall Risk Assessment:  :     Fall Risk Assessment, last 12 mths 2017   Able to walk? Yes   Fall in past 12 months?  No   Fall with injury? -   Number of falls in past 12 months -   Fall Risk Score -       Abuse Screening:  :     Abuse Screening Questionnaire 2017 2017 3/6/2014   Do you ever feel afraid of your partner? N N N   Are you in a relationship with someone who physically or mentally threatens you? N N N   Is it safe for you to go home? Y Y Y       Coordination of Care Questionnaire:  :     1) Have you been to an emergency room, urgent care clinic since your last visit? no   Hospitalized since your last visit? no             2) Have you seen or consulted any other health care providers outside of [address] [place] [address] since your last visit? no  (Include any pap smears or colon screenings in this section.)    3) Do you have an Advance Directive on file? no    4) Are you interested in receiving information on Advance Directives? NO      Patient is accompanied by self I have received verbal consent from Rajesh English to discuss any/all medical information while they are present in the room. Reviewed record  In preparation for visit and have obtained necessary documentation.

## 2018-08-08 ENCOUNTER — OFFICE VISIT (OUTPATIENT)
Dept: INTERNAL MEDICINE CLINIC | Age: 73
End: 2018-08-08

## 2018-08-08 VITALS
SYSTOLIC BLOOD PRESSURE: 130 MMHG | RESPIRATION RATE: 14 BRPM | WEIGHT: 133.8 LBS | HEART RATE: 94 BPM | BODY MASS INDEX: 23.71 KG/M2 | TEMPERATURE: 97.8 F | DIASTOLIC BLOOD PRESSURE: 80 MMHG | HEIGHT: 63 IN | OXYGEN SATURATION: 98 %

## 2018-08-08 DIAGNOSIS — M25.512 LEFT SHOULDER PAIN, UNSPECIFIED CHRONICITY: Primary | ICD-10-CM

## 2018-08-08 NOTE — PROGRESS NOTES
Subjective:     Chief Complaint   Patient presents with    Arm Pain     left     She  is a 72y.o. year old female who presents for evaluation. Persistent left arm pain. 4 years ago while walking dog fell and slammed left shoulder into ground. 4-6 weeks ago while walking son's dog feel again on left shoulder. Has been using a bandage. Using CBD cream which seems to help. (Has cannabis in it). No radiation of pain into arm. Can raise arm and scratch her back      Historical Data    Past Medical History:   Diagnosis Date    Arthritis     spine    Ductal carcinoma in situ (DCIS) of right breast 2009    lumpectomy x2, XRT (followed by Dr. Sonny Krause)   [facility] History of colon polyps     Osteopenia 09/2016    mild, R fem neck -1.0        Past Surgical History:   Procedure Laterality Date    HX BLADDER SUSPENSION      HX BREAST LUMPECTOMY      x2 (right)    HX COLONOSCOPY  2010    normal (but has had a h/o colon polyps), repeat q5 yrs, Dr. Kisha Connor Prescriptions as of 8/8/2018   Medication Sig Dispense Refill    calcium carbonate (TUMS) 200 mg calcium (500 mg) Chew Take 1 Tab by mouth daily. 30 Tab 3     No facility-administered encounter medications on file as of 8/8/2018. Allergies   Allergen Reactions    Penicillins Anaphylaxis        Social History     Social History    Marital status: SINGLE     Spouse name: N/A    Number of children: N/A    Years of education: N/A     Occupational History    Not on file. Social History Main Topics    Smoking status: Never Smoker    Smokeless tobacco: Never Used    Alcohol use No    Drug use: No    Sexual activity: Not Currently     Other Topics Concern    Not on file     Social History Narrative    ** Merged History Encounter **             Review of Systems  Pertinent items are noted in HPI.     Objective:     Vitals:    08/08/18 1532   BP: 130/80   Pulse: 94   Resp: 14   Temp: 97.8 °F (36.6 °C)   TempSrc: Oral   SpO2: 98% Weight: 133 lb 12.8 oz (60.7 kg)   Height: 5' 3\" (1.6 m)     Pleasant WF in no acute distress. Left shoulder exam: Unable to fully abduct or flex. Empty Coke bottle test is negative. Cross over test is positive. Internal rotation is painful. ASSESSMENT / PLAN:   1. Left shoulder pain, unspecified chronicity  · DDx is calcific tendonitis, DJD, bursitis, RTC tear  - MRI SHOULDER LT WO CONT; Future    Return to see me 48 hours after xray is done. Follow-up Disposition: Not on File   Advised her to call back or return to office if symptoms worsen/change/persist.  Discussed expected course/resolution/complications of diagnosis in detail with patient. Medication risks/benefits/costs/interactions/alternatives discussed with patient. She was given an after visit summary which includes diagnoses, current medications, & vitals. She expressed understanding with the diagnosis and plan.

## 2018-08-08 NOTE — PROGRESS NOTES
Chief Complaint   Patient presents with    Arm Pain     left     Reviewed record in preparation for visit and have obtained necessary documentation. Identified pt with two pt identifiers(name and ). Health Maintenance Due   Topic    ZOSTER VACCINE AGE 60>     GLAUCOMA SCREENING Q2Y     Influenza Age 5 to Adult          Chief Complaint   Patient presents with    Arm Pain     left        Wt Readings from Last 3 Encounters:   18 133 lb 12.8 oz (60.7 kg)   18 137 lb (62.1 kg)   17 136 lb 3.2 oz (61.8 kg)     Temp Readings from Last 3 Encounters:   18 97.8 °F (36.6 °C) (Oral)   18 97 °F (36.1 °C) (Oral)   17 97.3 °F (36.3 °C) (Oral)     BP Readings from Last 3 Encounters:   18 130/80   18 110/70   17 130/80     Pulse Readings from Last 3 Encounters:   18 94   18 70   17 90           Learning Assessment:  :     Learning Assessment 2018 2017 3/6/2014   PRIMARY LEARNER Patient Patient Patient   HIGHEST LEVEL OF EDUCATION - PRIMARY LEARNER  4 YEARS OF COLLEGE 4 YEARS OF COLLEGE 4 YEARS OF COLLEGE   BARRIERS PRIMARY LEARNER - NONE NONE   CO-LEARNER CAREGIVER No No No   PRIMARY LANGUAGE ENGLISH ENGLISH ENGLISH    NEED - - No   LEARNER PREFERENCE PRIMARY DEMONSTRATION DEMONSTRATION LISTENING     - LISTENING READING     - READING DEMONSTRATION   LEARNING SPECIAL TOPICS - - no   ANSWERED BY patient patient patient   RELATIONSHIP SELF SELF SELF   ASSESSMENT COMMENT - - none       Depression Screening:  :     PHQ over the last two weeks 2018   Little interest or pleasure in doing things Not at all   Feeling down, depressed, irritable, or hopeless Not at all   Total Score PHQ 2 0       Fall Risk Assessment:  :     Fall Risk Assessment, last 12 mths 2018   Able to walk? Yes   Fall in past 12 months? Yes   Fall with injury?  No   Number of falls in past 12 months 1   Fall Risk Score 1       Abuse Screening:  :     Abuse Screening Questionnaire 8/8/2018 8/18/2017 6/22/2017 3/6/2014   Do you ever feel afraid of your partner? N N N N   Are you in a relationship with someone who physically or mentally threatens you? N N N N   Is it safe for you to go home? Y Y Y Y       Coordination of Care Questionnaire:  :     1) Have you been to an emergency room, urgent care clinic since your last visit? no   Hospitalized since your last visit? no             2) Have you seen or consulted any other health care providers outside of [address] [place] [address] since your last visit? no  (Include any pap smears or colon screenings in this section.)    3) Do you have an Advance Directive on file? yes    4) Are you interested in receiving information on Advance Directives? NO      Patient is accompanied by self I have received verbal consent from Juan Carlos Barton to discuss any/all medical information while they are present in the room. Reviewed record  In preparation for visit and have obtained necessary documentation.

## 2018-08-12 ENCOUNTER — HOSPITAL ENCOUNTER (OUTPATIENT)
Dept: MRI IMAGING | Age: 73
Discharge: HOME | End: 2018-08-12
Attending: INTERNAL MEDICINE

## 2018-08-12 DIAGNOSIS — M25.512 LEFT SHOULDER PAIN, UNSPECIFIED CHRONICITY: ICD-10-CM

## 2018-08-14 ENCOUNTER — OFFICE VISIT (OUTPATIENT)
Dept: INTERNAL MEDICINE CLINIC | Age: 73
End: 2018-08-14

## 2018-08-14 VITALS
SYSTOLIC BLOOD PRESSURE: 110 MMHG | BODY MASS INDEX: 24.24 KG/M2 | TEMPERATURE: 97.5 F | HEIGHT: 63 IN | HEART RATE: 74 BPM | OXYGEN SATURATION: 98 % | DIASTOLIC BLOOD PRESSURE: 60 MMHG | RESPIRATION RATE: 14 BRPM | WEIGHT: 136.8 LBS

## 2018-08-14 DIAGNOSIS — M19.012 ARTHROPATHY OF LEFT SHOULDER: Primary | ICD-10-CM

## 2018-08-14 NOTE — MR AVS SNAPSHOT
[address] [facility], [address] [place] [address] 
[phone] Patient: Mayur Hauser MRN: [id number] IFQ:[id number] Visit Information Date & Time Provider Department Dept. Phone Encounter #  
 8/14/2018 12:45 PM Quinten Rodriguez MD [facility] [phone] [id number] Follow-up Instructions Return if symptoms worsen or fail to improve. Upcoming Health Maintenance Date Due ZOSTER VACCINE AGE 60> 11/11/2006 GLAUCOMA SCREENING Q2Y 1/1/2017 Influenza Age 5 to Adult 10/31/2018* MEDICARE YEARLY EXAM 8/19/2018 BREAST CANCER SCRN MAMMOGRAM 3/29/2020 DTaP/Tdap/Td series (2 - Td) 10/18/2021 COLONOSCOPY 3/22/2023 *Topic was postponed. The date shown is not the original due date. Allergies as of 8/14/2018  Review Complete On: 8/14/2018 By: Quinten Rodriguez MD  
  
 Severity Noted Reaction Type Reactions Penicillins  07/18/2011    Anaphylaxis Current Immunizations  Reviewed on 12/21/2017 Name Date Influenza Vaccine 10/1/2017, 10/1/2016, 10/9/2013 Pneumococcal Conjugate (PCV-13) 11/11/2016 Tdap 10/18/2011 ZZZ-RETIRED (DO NOT USE) Pneumococcal Vaccine (Unspecified Type) 9/27/2012 Not reviewed this visit You Were Diagnosed With   
  
 Codes Comments Arthropathy of left shoulder    -  Primary ICD-10-CM: C57.182 
ICD-9-CM: 716.91 Vitals BP Pulse Temp Resp Height(growth percentile) Weight(growth percentile) 110/60 (BP 1 Location: Right arm, BP Patient Position: Sitting) 74 97.5 °F (36.4 °C) 14 5' 3\" (1.6 m) 136 lb 12.8 oz (62.1 kg) SpO2 BMI OB Status Smoking Status 98% 24.23 kg/m2 Hysterectomy Never Smoker Vitals History BMI and BSA Data Body Mass Index Body Surface Area  
 24.23 kg/m 2 1.66 m 2 Preferred Pharmacy Pharmacy Name Phone [id number] [place], [address] [place] [phone] Your Updated Medication List  
  
   
This list is accurate as of 8/14/18 12:55 PM.  Always use your most recent med list.  
  
  
  
  
 calcium carbonate 200 mg calcium (500 mg) Elliott Commonly known as:  TUMS Take 1 Tab by mouth daily. We Performed the Following REFERRAL TO ORTHOPEDIC SURGERY [REF62 Custom] Comments:  
 Please evaluate patient for left shoulder pain Follow-up Instructions Return if symptoms worsen or fail to improve. Referral Information Referral ID Referred By Referred To  
  
 [id number]  [address] [place], [address] [place] [address] Phone: [phone] Fax: [phone] Visits Status Start Date End Date 1 New Request 8/14/18 8/14/19 If your referral has a status of pending review or denied, additional information will be sent to support the outcome of this decision. Patient Instructions See Dr Hafsa Leong for assessment of your left shoulder. Introducing [facility]! Juancho Robles introduces [employer] patient portal. Now you can access parts of your medical record, email your doctor's office, and request medication refills online. 1. In your internet browser, go to [email or web address]. FRS/"[employer] 2. Click on the First Time User? Click Here link in the Sign In box. You will see the New Member Sign Up page. 3. Enter your [employer] Access Code exactly as it appears below. You will not need to use this code after youve completed the sign-up process. If you do not sign up before the expiration date, you must request a new code. · [employer] Access Code: -[email or web address] Expires: 11/6/2018  3:55 PM 
 
4. Enter the last four digits of your Social Security Number (xxxx) and Date of Birth (mm/dd/yyyy) as indicated and click Submit. You will be taken to the next sign-up page. 5. Create a [facility] ID. This will be your [facility] login ID and cannot be changed, so think of one that is secure and easy to remember. 6. Create a [facility] password. You can change your password at any time. 7. Enter your Password Reset Question and Answer. This can be used at a later time if you forget your password. 8. Enter your e-mail address. You will receive e-mail notification when new information is available in [address] 9. Click Sign Up. You can now view and download portions of your medical record. 10. Click the Download Summary menu link to download a portable copy of your medical information. If you have questions, please visit the Frequently Asked Questions section of the [facility] website. Remember, [facility] is NOT to be used for urgent needs. For medical emergencies, dial 911. Now available from your iPhone and Android! Please provide this summary of care documentation to your next provider. Your primary care clinician is listed as Susie Platt. If you have any questions after today's visit, please call [phone].

## 2018-08-14 NOTE — PROGRESS NOTES
Chief Complaint   Patient presents with    Results     discuss     Reviewed record in preparation for visit and have obtained necessary documentation. Identified pt with two pt identifiers(name and ). Health Maintenance Due   Topic    ZOSTER VACCINE AGE 60>     GLAUCOMA SCREENING Q2Y          Chief Complaint   Patient presents with    Results     discuss        Wt Readings from Last 3 Encounters:   18 136 lb 12.8 oz (62.1 kg)   18 133 lb 12.8 oz (60.7 kg)   18 137 lb (62.1 kg)     Temp Readings from Last 3 Encounters:   18 97.5 °F (36.4 °C)   18 97.8 °F (36.6 °C) (Oral)   18 97 °F (36.1 °C) (Oral)     BP Readings from Last 3 Encounters:   18 110/60   18 130/80   18 110/70     Pulse Readings from Last 3 Encounters:   18 74   18 94   18 70           Learning Assessment:  :     Learning Assessment 2018 2017 3/6/2014   PRIMARY LEARNER Patient Patient Patient   HIGHEST LEVEL OF EDUCATION - PRIMARY LEARNER  4 YEARS OF COLLEGE 4 YEARS OF COLLEGE 4 YEARS OF COLLEGE   BARRIERS PRIMARY LEARNER - NONE NONE   CO-LEARNER CAREGIVER No No No   PRIMARY LANGUAGE ENGLISH ENGLISH ENGLISH    NEED - - No   LEARNER PREFERENCE PRIMARY DEMONSTRATION DEMONSTRATION LISTENING     - LISTENING READING     - READING DEMONSTRATION   LEARNING SPECIAL TOPICS - - no   ANSWERED BY patient patient patient   RELATIONSHIP SELF SELF SELF   ASSESSMENT COMMENT - - none       Depression Screening:  :     PHQ over the last two weeks 2018   Little interest or pleasure in doing things Not at all   Feeling down, depressed, irritable, or hopeless Not at all   Total Score PHQ 2 0       Fall Risk Assessment:  :     Fall Risk Assessment, last 12 mths 2018   Able to walk? Yes   Fall in past 12 months? Yes   Fall with injury?  No   Number of falls in past 12 months 1   Fall Risk Score 1       Abuse Screening:  :     Abuse Screening Questionnaire 2018 8/18/2017 6/22/2017 3/6/2014   Do you ever feel afraid of your partner? N N N N   Are you in a relationship with someone who physically or mentally threatens you? N N N N   Is it safe for you to go home? Y Y Y Y       Coordination of Care Questionnaire:  :     1) Have you been to an emergency room, urgent care clinic since your last visit? no   Hospitalized since your last visit? no             2) Have you seen or consulted any other health care providers outside of [address] [place] [address] since your last visit? no  (Include any pap smears or colon screenings in this section.)    3) Do you have an Advance Directive on file? no    4) Are you interested in receiving information on Advance Directives? NO      Patient is accompanied by self I have received verbal consent from Ivonne Bledsoe to discuss any/all medical information while they are present in the room. Reviewed record  In preparation for visit and have obtained necessary documentation.

## 2018-08-14 NOTE — PROGRESS NOTES
Subjective:     Chief Complaint   Patient presents with    Results     discuss     She  is a 72y.o. year old female who presents for evaluation. Couldn't have MRI due to metal in an implant that she has. Still with left shoulder pain. Historical Data    Past Medical History:   Diagnosis Date    Arthritis     spine    Ductal carcinoma in situ (DCIS) of right breast 2009    lumpectomy x2, XRT (followed by Dr. Charmayne Landsman)   José Luis Dove History of colon polyps     Osteopenia 09/2016    mild, R fem neck -1.0        Past Surgical History:   Procedure Laterality Date    HX BLADDER SUSPENSION      HX BREAST LUMPECTOMY      x2 (right)    HX COLONOSCOPY  2010    normal (but has had a h/o colon polyps), repeat q5 yrs, Dr. Odalys Saha Prescriptions as of 8/14/2018   Medication Sig Dispense Refill    calcium carbonate (TUMS) 200 mg calcium (500 mg) Chew Take 1 Tab by mouth daily. 30 Tab 3     No facility-administered encounter medications on file as of 8/14/2018. Allergies   Allergen Reactions    Penicillins Anaphylaxis        Social History     Social History    Marital status: SINGLE     Spouse name: N/A    Number of children: N/A    Years of education: N/A     Occupational History    Not on file. Social History Main Topics    Smoking status: Never Smoker    Smokeless tobacco: Never Used    Alcohol use No    Drug use: No    Sexual activity: Not Currently     Other Topics Concern    Not on file     Social History Narrative    ** Merged History Encounter **             Review of Systems  Pertinent items are noted in HPI. Objective:     Vitals:    08/14/18 1239   BP: 110/60   Pulse: 74   Resp: 14   Temp: 97.5 °F (36.4 °C)   SpO2: 98%   Weight: 136 lb 12.8 oz (62.1 kg)   Height: 5' 3\" (1.6 m)     Pleasant WF in no acute distress. Left shoulder exam reveals restricted range of motion with pain. Not specific for RTC tear. ASSESSMENT / PLAN:   1.  Arthropathy of left shoulder  · Refer to specialist.  - REFERRAL TO ORTHOPEDIC SURGERY             Follow-up Disposition:  Return if symptoms worsen or fail to improve. Advised her to call back or return to office if symptoms worsen/change/persist.  Discussed expected course/resolution/complications of diagnosis in detail with patient. Medication risks/benefits/costs/interactions/alternatives discussed with patient. She was given an after visit summary which includes diagnoses, current medications, & vitals. She expressed understanding with the diagnosis and plan.

## 2019-02-13 ENCOUNTER — OFFICE VISIT (OUTPATIENT)
Dept: INTERNAL MEDICINE CLINIC | Age: 74
End: 2019-02-13

## 2019-02-13 VITALS
RESPIRATION RATE: 16 BRPM | TEMPERATURE: 96.4 F | OXYGEN SATURATION: 97 % | WEIGHT: 136 LBS | SYSTOLIC BLOOD PRESSURE: 128 MMHG | HEART RATE: 93 BPM | BODY MASS INDEX: 24.1 KG/M2 | DIASTOLIC BLOOD PRESSURE: 72 MMHG | HEIGHT: 63 IN

## 2019-02-13 DIAGNOSIS — Z12.11 COLON CANCER SCREENING: ICD-10-CM
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
## 2019-02-13 DIAGNOSIS — J30.89 ENVIRONMENTAL AND SEASONAL ALLERGIES: ICD-10-CM

## 2019-02-13 DIAGNOSIS — Z00.00 MEDICARE ANNUAL WELLNESS VISIT, SUBSEQUENT: Primary | ICD-10-CM

## 2019-02-13 NOTE — PATIENT INSTRUCTIONS
Schedule your cologuard Schedule with Dermatology Try daily antihistamine (zyrtec or claritin) OR a daily steroid nasal spray (nasacort or flonase) daily Saline nasal spray 1-2 times a day

## 2019-02-13 NOTE — PROGRESS NOTES
Subjective:  
  
Dania Hicks is a 69 y.o. female who presents today to [facility] care and for [facility] Previously followed by Dr. 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 Loves being retired No regular medications other than tums Yoga, weights, gym, regular exercise, treadmill and elliptical 
 
S/p bladder repair, had DAMIR BSO with this No HRT, took for about 5 years in the past, none recently Mammogram: UTD 03/2018. Has history of DCIS. Lumpectomy and radiation ~ 10 years ago. Does imaging through [facility] Pap: s/p DAMIR-BSO, following prolapsed uterus DEXA: 2016, normal/osteopenia Refuses colonoscopy, will do FIT or cologuard FIT 09/17 normal 
 
Cough, nonproductive and persistent x several months. December and January felt a little wheezy- has resolved No history of asthma or copd. No previously diagnosed seasonal or environmental allergies Improving on its own Tried Vicks vapor rub No chest pain, palpitations, dyspnea, headaches or dizziness Few months ago had 1 brief episode of mid abdominal pain, then for a few weeks following had decreased appetite and slightly more increased frequency of BMs (~ 2x/day). Lost about 6lb unintentionally during this time. Has since gained it back. Both of these have resolved No more abdominal pain, changes in bowels, or blood in his stool Annual Wellness Visit End of Life Planning: on file Info for [employer]: Given to patient, see After Visit Summary Depression Screen: Patient Health Questionnaire (PHQ-2) Over the last 2 weeks, how often have you been bothered by any of the following problems? Little interest or pleasure in doing things? Not at all. [0] Feeling down, depressed, or hopeless? Not at all. [0] Total Score: 0/6 PHQ-2 Assessment Scoring: A score of 2 or more requires further screening with the PHQ-9 N/A Alcohol Risk Factor Screening: You do not drink alcohol or very rarely. Hearing Loss: 
Hearing is good. Activities of Daily Living: 
Self-care. Requires assistance with: no ADLs Fall Risk: 
No fall risk factors Abuse Screen: 
None Adult Nutrition Screen: No risk factors noted. Patient Active Problem List  
 Diagnosis Date Noted  Osteopenia 09/01/2016  Advanced care planning/counseling discussion 08/06/2016  Hx of colonic polyps 10/18/2013  Hx of lumpectomy 10/18/2013  DCIS (ductal carcinoma in situ) 03/04/2013  S/P DAMIR-BSO 03/04/2013  Sciatica 09/20/2011 Current Outpatient Medications Medication Sig Dispense Refill  calcium carbonate (TUMS) 200 mg calcium (500 mg) Chew Take 1 Tab by mouth daily. 30 Tab 3 Review of Systems Pertinent items are noted in HPI. Objective:  
 
Visit Vitals /72 (BP 1 Location: Left arm, BP Patient Position: Sitting) Pulse 93 Temp 96.4 °F (35.8 °C) (Oral) Resp 16 Ht 5' 3\" (1.6 m) Wt 136 lb (61.7 kg) SpO2 97% BMI 24.09 kg/m² General appearance: alert, cooperative, no distress, appears stated age Head: Normocephalic, without obvious abnormality, atraumatic Ears: bilateral external canals and TMs normal 
Throat: + PND, pale oropharynx Lungs: clear to auscultation bilaterally Heart: regular rate and rhythm, S1, S2 normal, no murmur, click, rub or gallop Abdomen: soft, non-tender. Bowel sounds normal. No masses,  no organomegaly Neurologic: Grossly normal 
Psych: calm, cooperative, appropriate affect Assessment/Plan: 1. Medicare annual wellness visit, subsequent 
- CBC WITH AUTOMATED DIFF 
- METABOLIC PANEL, COMPREHENSIVE 
- TSH 3RD GENERATION 
- LIPID PANEL 2. Environmental and seasonal allergies - daily antihistamine and/or nasal steroid 
- saline nasal spray 3. Colon cancer screening 
- COLOGUARD TEST (FECAL DNA COLORECTAL CANCER SCREENING) 4. Mild sun exposure, sequela 
- referral to derm - REFERRAL TO DERMATOLOGY Advised her to call back or return to office if symptoms worsen/change/persist. 
Discussed expected course/resolution/complications of diagnosis in detail with patient. Medication risks/benefits/costs/interactions/alternatives discussed with patient. She was given an after visit summary which includes diagnoses, current medications, & vitals. She expressed understanding with the diagnosis and plan.  
 
KAMI Sawyer

## 2019-02-13 NOTE — PROGRESS NOTES
Neha Rock is a 69 y.o. female Chief Complaint [facility] Keke Arndt patient. 1. Have you been to the ER, urgent care clinic since your last visit? Hospitalized since your last visit? No 
 
2. Have you seen or consulted any other health care providers outside of the [address] [place] [address] since your last visit? Include any pap smears or colon screening. No  
 
Visit Vitals /72 (BP 1 Location: Left arm, BP Patient Position: Sitting) Pulse 93 Temp 96.4 °F (35.8 °C) (Oral) Resp 16 Ht 5' 3\" (1.6 m) Wt 136 lb (61.7 kg) SpO2 97% BMI 24.09 kg/m² Health Maintenance Due Topic Date Due  Shingrix Vaccine Age 50> (1 of 2) 01/11/1997  GLAUCOMA SCREENING Q2Y  01/01/2017  MEDICARE YEARLY EXAM  08/19/2018 Ellie Lawson LPN

## 2019-02-15 LAB
ALBUMIN SERPL-MCNC: 4.2 G/DL (ref 3.5–4.8)
ALBUMIN/GLOB SERPL: 1.7 {RATIO} (ref 1.2–2.2)
ALP SERPL-CCNC: 61 IU/L (ref 39–117)
ALT SERPL-CCNC: 21 IU/L (ref 0–32)
AST SERPL-CCNC: 23 IU/L (ref 0–40)
BASOPHILS # BLD AUTO: 0 X10E3/UL (ref 0–0.2)
BASOPHILS NFR BLD AUTO: 0 %
BILIRUB SERPL-MCNC: 1 MG/DL (ref 0–1.2)
BUN SERPL-MCNC: 17 MG/DL (ref 8–27)
BUN/CREAT SERPL: 18 (ref 12–28)
CALCIUM SERPL-MCNC: 9.7 MG/DL (ref 8.7–10.3)
CHLORIDE SERPL-SCNC: 103 MMOL/L (ref 96–106)
CHOLEST SERPL-MCNC: 186 MG/DL (ref 100–199)
CO2 SERPL-SCNC: 24 MMOL/L (ref 20–29)
CREAT SERPL-MCNC: 0.95 MG/DL (ref 0.57–1)
EOSINOPHIL # BLD AUTO: 0.2 X10E3/UL (ref 0–0.4)
EOSINOPHIL NFR BLD AUTO: 5 %
ERYTHROCYTE [DISTWIDTH] IN BLOOD BY AUTOMATED COUNT: 14.2 % (ref 12.3–15.4)
GLOBULIN SER CALC-MCNC: 2.5 G/DL (ref 1.5–4.5)
GLUCOSE SERPL-MCNC: 96 MG/DL (ref 65–99)
HCT VFR BLD AUTO: 42.6 % (ref 34–46.6)
HDLC SERPL-MCNC: 65 MG/DL
HGB BLD-MCNC: 14.1 G/DL (ref 11.1–15.9)
IMM GRANULOCYTES # BLD AUTO: 0 X10E3/UL (ref 0–0.1)
IMM GRANULOCYTES NFR BLD AUTO: 0 %
INTERPRETATION, 910389: NORMAL
LDLC SERPL CALC-MCNC: 103 MG/DL (ref 0–99)
LYMPHOCYTES # BLD AUTO: 1.3 X10E3/UL (ref 0.7–3.1)
LYMPHOCYTES NFR BLD AUTO: 32 %
MCH RBC QN AUTO: 27.6 PG (ref 26.6–33)
MCHC RBC AUTO-ENTMCNC: 33.1 G/DL (ref 31.5–35.7)
MCV RBC AUTO: 83 FL (ref 79–97)
MONOCYTES # BLD AUTO: 0.4 X10E3/UL (ref 0.1–0.9)
MONOCYTES NFR BLD AUTO: 10 %
NEUTROPHILS # BLD AUTO: 2.1 X10E3/UL (ref 1.4–7)
NEUTROPHILS NFR BLD AUTO: 53 %
PLATELET # BLD AUTO: 198 X10E3/UL (ref 150–379)
POTASSIUM SERPL-SCNC: 4.2 MMOL/L (ref 3.5–5.2)
PROT SERPL-MCNC: 6.7 G/DL (ref 6–8.5)
RBC # BLD AUTO: 5.11 X10E6/UL (ref 3.77–5.28)
SODIUM SERPL-SCNC: 142 MMOL/L (ref 134–144)
TRIGL SERPL-MCNC: 92 MG/DL (ref 0–149)
TSH SERPL DL<=0.005 MIU/L-ACNC: 1.72 UIU/ML (ref 0.45–4.5)
VLDLC SERPL CALC-MCNC: 18 MG/DL (ref 5–40)
WBC # BLD AUTO: 3.9 X10E3/UL (ref 3.4–10.8)

## 2019-03-06 ENCOUNTER — TELEPHONE (OUTPATIENT)
Dept: INTERNAL MEDICINE CLINIC | Age: 74
End: 2019-03-06

## 2019-03-06 DIAGNOSIS — R19.5 POSITIVE COLORECTAL CANCER SCREENING USING COLOGUARD TEST: Primary | ICD-10-CM

## 2019-03-06 NOTE — TELEPHONE ENCOUNTER
Returned call to patient    Spoke with patient, reviewed + cologuard results. Needs colonoscopy for further evaluation. Patient agreeable. She has seen Dr. Carli Parry with [facility] in the past and will call him to schedule an appointment.   Phone number provided to patient    Patient to see GI within the next month for follow up    Patient verbalized understanding of result, plan, and need for prompt follow up    Anuja Clemons NP

## 2019-03-06 NOTE — TELEPHONE ENCOUNTER
----- Message from Susie Rey sent at 3/6/2019  1:32 PM EST -----  Regarding: John/telephone  Pt is returning a call from today in regard to her lab work. Pts number is [phone].

## 2019-03-06 NOTE — TELEPHONE ENCOUNTER
Cologuard resulted Positive    LVM on mobile phone requesting return call to discuss Cologuard Results    Home phone out of service    Will wait for return call    Cliff Domínguez NP

## 2019-07-11 ENCOUNTER — HOSPITAL ENCOUNTER (OUTPATIENT)
Age: 74
Setting detail: OUTPATIENT SURGERY
Discharge: HOME | End: 2019-07-11
Attending: INTERNAL MEDICINE | Admitting: INTERNAL MEDICINE
Payer: MEDICARE

## 2019-07-11 ENCOUNTER — ANESTHESIA (OUTPATIENT)
Dept: ENDOSCOPY | Age: 74
End: 2019-07-11
Payer: MEDICARE

## 2019-07-11 ENCOUNTER — ANESTHESIA EVENT (OUTPATIENT)
Dept: ENDOSCOPY | Age: 74
End: 2019-07-11
Payer: MEDICARE

## 2019-07-11 VITALS
BODY MASS INDEX: 23.03 KG/M2 | DIASTOLIC BLOOD PRESSURE: 51 MMHG | OXYGEN SATURATION: 100 % | WEIGHT: 130 LBS | HEART RATE: 85 BPM | TEMPERATURE: 97.5 F | RESPIRATION RATE: 19 BRPM | SYSTOLIC BLOOD PRESSURE: 122 MMHG

## 2019-07-11 PROCEDURE — 77030013992 HC SNR POLYP ENDOSC BSC -B: Performed by: INTERNAL MEDICINE

## 2019-07-11 PROCEDURE — 74011250636 HC RX REV CODE- 250/636

## 2019-07-11 PROCEDURE — 77030040684 HC NDL ENDOSC NEEDLEMASTR OCOA -B: Performed by: INTERNAL MEDICINE

## 2019-07-11 PROCEDURE — 76060000032 HC ANESTHESIA 0.5 TO 1 HR: Performed by: INTERNAL MEDICINE

## 2019-07-11 PROCEDURE — 77030027957 HC TBNG IRR ENDOGTR BUSS -B: Performed by: INTERNAL MEDICINE

## 2019-07-11 PROCEDURE — 77030020268 HC MISC GENERAL SUPPLY: Performed by: INTERNAL MEDICINE

## 2019-07-11 PROCEDURE — 88305 TISSUE EXAM BY PATHOLOGIST: CPT

## 2019-07-11 PROCEDURE — 77030021593 HC FCPS BIOP ENDOSC BSC -A: Performed by: INTERNAL MEDICINE

## 2019-07-11 PROCEDURE — 76040000007: Performed by: INTERNAL MEDICINE

## 2019-07-11 PROCEDURE — 77030010936 HC CLP LIG BSC -C: Performed by: INTERNAL MEDICINE

## 2019-07-11 PROCEDURE — 74011250637 HC RX REV CODE- 250/637: Performed by: INTERNAL MEDICINE

## 2019-07-11 RX ORDER — LIDOCAINE HYDROCHLORIDE 20 MG/ML
INJECTION, SOLUTION EPIDURAL; INFILTRATION; INTRACAUDAL; PERINEURAL AS NEEDED
Status: DISCONTINUED | OUTPATIENT
Start: 2019-07-11 | End: 2019-07-11 | Stop reason: HOSPADM

## 2019-07-11 RX ORDER — PROPOFOL 10 MG/ML
INJECTION, EMULSION INTRAVENOUS AS NEEDED
Status: DISCONTINUED | OUTPATIENT
Start: 2019-07-11 | End: 2019-07-11 | Stop reason: HOSPADM

## 2019-07-11 RX ORDER — FENTANYL CITRATE 50 UG/ML
25-200 INJECTION, SOLUTION INTRAMUSCULAR; INTRAVENOUS
Status: DISCONTINUED | OUTPATIENT
Start: 2019-07-11 | End: 2019-07-11 | Stop reason: HOSPADM

## 2019-07-11 RX ORDER — NALOXONE HYDROCHLORIDE 0.4 MG/ML
0.4 INJECTION, SOLUTION INTRAMUSCULAR; INTRAVENOUS; SUBCUTANEOUS
Status: DISCONTINUED | OUTPATIENT
Start: 2019-07-11 | End: 2019-07-11 | Stop reason: HOSPADM

## 2019-07-11 RX ORDER — SODIUM CHLORIDE 9 MG/ML
INJECTION, SOLUTION INTRAVENOUS
Status: DISCONTINUED | OUTPATIENT
Start: 2019-07-11 | End: 2019-07-11 | Stop reason: HOSPADM

## 2019-07-11 RX ORDER — ATROPINE SULFATE 0.1 MG/ML
0.5 INJECTION INTRAVENOUS
Status: DISCONTINUED | OUTPATIENT
Start: 2019-07-11 | End: 2019-07-12 | Stop reason: HOSPADM

## 2019-07-11 RX ORDER — SODIUM CHLORIDE 0.9 % (FLUSH) 0.9 %
5-40 SYRINGE (ML) INJECTION AS NEEDED
Status: DISCONTINUED | OUTPATIENT
Start: 2019-07-11 | End: 2019-07-12 | Stop reason: HOSPADM

## 2019-07-11 RX ORDER — FLUMAZENIL 0.1 MG/ML
0.2 INJECTION INTRAVENOUS
Status: DISCONTINUED | OUTPATIENT
Start: 2019-07-11 | End: 2019-07-11 | Stop reason: HOSPADM

## 2019-07-11 RX ORDER — DEXTROMETHORPHAN/PSEUDOEPHED 2.5-7.5/.8
1.2 DROPS ORAL
Status: DISCONTINUED | OUTPATIENT
Start: 2019-07-11 | End: 2019-07-12 | Stop reason: HOSPADM

## 2019-07-11 RX ORDER — SODIUM CHLORIDE 9 MG/ML
50 INJECTION, SOLUTION INTRAVENOUS CONTINUOUS
Status: DISCONTINUED | OUTPATIENT
Start: 2019-07-11 | End: 2019-07-11 | Stop reason: HOSPADM

## 2019-07-11 RX ORDER — SODIUM CHLORIDE 0.9 % (FLUSH) 0.9 %
5-40 SYRINGE (ML) INJECTION EVERY 8 HOURS
Status: DISCONTINUED | OUTPATIENT
Start: 2019-07-11 | End: 2019-07-12 | Stop reason: HOSPADM

## 2019-07-11 RX ORDER — EPINEPHRINE 0.1 MG/ML
1 INJECTION INTRACARDIAC; INTRAVENOUS
Status: DISCONTINUED | OUTPATIENT
Start: 2019-07-11 | End: 2019-07-12 | Stop reason: HOSPADM

## 2019-07-11 RX ADMIN — SODIUM CHLORIDE: 9 INJECTION, SOLUTION INTRAVENOUS at 11:16

## 2019-07-11 RX ADMIN — PROPOFOL 40 MG: 10 INJECTION, EMULSION INTRAVENOUS at 12:12

## 2019-07-11 RX ADMIN — SIMETHICONE 80 MG: 20 SUSPENSION/ DROPS ORAL at 12:00

## 2019-07-11 RX ADMIN — PROPOFOL 40 MG: 10 INJECTION, EMULSION INTRAVENOUS at 12:03

## 2019-07-11 RX ADMIN — PROPOFOL 40 MG: 10 INJECTION, EMULSION INTRAVENOUS at 12:06

## 2019-07-11 RX ADMIN — PROPOFOL 40 MG: 10 INJECTION, EMULSION INTRAVENOUS at 11:59

## 2019-07-11 RX ADMIN — PROPOFOL 40 MG: 10 INJECTION, EMULSION INTRAVENOUS at 12:09

## 2019-07-11 RX ADMIN — PROPOFOL 40 MG: 10 INJECTION, EMULSION INTRAVENOUS at 11:57

## 2019-07-11 RX ADMIN — PROPOFOL 40 MG: 10 INJECTION, EMULSION INTRAVENOUS at 11:56

## 2019-07-11 RX ADMIN — PROPOFOL 40 MG: 10 INJECTION, EMULSION INTRAVENOUS at 12:15

## 2019-07-11 RX ADMIN — PROPOFOL 20 MG: 10 INJECTION, EMULSION INTRAVENOUS at 11:54

## 2019-07-11 RX ADMIN — PROPOFOL 80 MG: 10 INJECTION, EMULSION INTRAVENOUS at 11:51

## 2019-07-11 RX ADMIN — PROPOFOL 20 MG: 10 INJECTION, EMULSION INTRAVENOUS at 11:52

## 2019-07-11 RX ADMIN — PROPOFOL 40 MG: 10 INJECTION, EMULSION INTRAVENOUS at 12:18

## 2019-07-11 RX ADMIN — LIDOCAINE HYDROCHLORIDE 40 MG: 20 INJECTION, SOLUTION EPIDURAL; INFILTRATION; INTRACAUDAL; PERINEURAL at 11:51

## 2019-07-11 NOTE — ANESTHESIA PREPROCEDURE EVALUATION
Relevant Problems   No relevant active problems       Anesthetic History   No history of anesthetic complications            Review of Systems / Medical History  Patient summary reviewed, nursing notes reviewed and pertinent labs reviewed    Pulmonary  Within defined limits                 Neuro/Psych   Within defined limits           Cardiovascular  Within defined limits                     GI/Hepatic/Renal  Within defined limits              Endo/Other        Arthritis and cancer     Other Findings              Physical Exam    Airway  Mallampati: II  TM Distance: > 6 cm  Neck ROM: normal range of motion   Mouth opening: Normal     Cardiovascular  Regular rate and rhythm,  S1 and S2 normal,  no murmur, click, rub, or gallop             Dental  No notable dental hx       Pulmonary  Breath sounds clear to auscultation               Abdominal  GI exam deferred       Other Findings            Anesthetic Plan    ASA: 2  Anesthesia type: MAC            Anesthetic plan and risks discussed with: Patient

## 2019-07-11 NOTE — PROCEDURES
[address]  [facility], [address] [place] [address]        Colonoscopy Operative Report    Juan Healy  [id number]  1945      Procedure Type:   Colonoscopy with endoscopic mucosal resection     Indications: Positive Cologuard test        Pre-operative Diagnosis: see indication above    Post-operative Diagnosis:  See findings below    :  Mack Estevez MD      Referring Provider: Kaylee Cook NP      Sedation:  MAC anesthesia Propofol      Procedure Details:  After informed consent was obtained with all risks and benefits of procedure explained and preoperative exam completed, the patient was taken to the endoscopy suite and placed in the left lateral decubitus position. Upon sequential sedation as per above, a digital rectal exam was performed demonstrating internal hemorrhoids. The Olympus pediatric videocolonoscope  was inserted in the rectum and carefully advanced to the cecum, which was identified by the ileocecal valve and appendiceal orifice. The cecum was identified by the ileocecal valve and appendiceal orifice. The quality of preparation was good. The colonoscope was slowly withdrawn with careful evaluation between folds. Retroflexion in the rectum was completed . Findings:   Rectum: single sessile 1-2 mm polyp - removed with cold biopsy forceps  Sigmoid: normal  Descending Colon: normal  Transverse Colon: normal  Ascending Colon: single 12-13 mm sessile polyp at the hepatic flexure. The polyp was successfully lifted with ORISE gel lifting agent. This clearly demarcated the borders of the polyp. Next, using a 13 mm stiff hexagonal snare, the polyp was removed in one piece. The mucosal defect was closed with a single Resolution 360 clip. Cecum: single 2-3 mm sessile polyp - removed with cold biopsy forceps  Terminal Ileum: not intubated      Specimen Removed: 1. Cecal polyp, 2. Ascending colon polyp, 3. Rectal polyp    Complications: None.      EBL: None.    Impression:      1. Colon polyps - removed as above  2. Small internal hemorrhoids    Recommendations:  1. Follow up surgical pathology  2. Repeat colonoscopy in 3-5 years based on pathology results  3. High fiber diet education  4. Avoid NSAID's for two weeks    Signed By: Adry Jane.  Morena Sánchez MD     7/11/2019  12:23 PM

## 2019-07-11 NOTE — DISCHARGE INSTRUCTIONS
[address], [address] [place] [address]    COLON DISCHARGE INSTRUCTIONS    Florencio Klinefelter  [id number]  1945    Discomfort:  Redness at IV site- apply warm compress to area; if redness or soreness persist- contact your physician  There may be a slight amount of blood passed from the rectum  Gaseous discomfort- walking, belching will help relieve any discomfort  You may not operate a vehicle for 12 hours  You may not engage in an occupation involving machinery or appliances for rest of today  You may not drink alcoholic beverages for at least 12 hours  Avoid making any critical decisions for at least 24 hour  DIET:  You may resume your regular diet - however -  remember your colon is empty and a heavy meal will produce gas. Avoid these foods:  vegetables, fried / greasy foods, carbonated drinks     ACTIVITY:  You may  resume your normal daily activities it is recommended that you spend the remainder of the day resting -  avoid any strenuous activity. CALL M.D. ANY SIGN OF:   Increasing pain, nausea, vomiting  Abdominal distension (swelling)  New increased bleeding (oral or rectal)  Fever (chills)  Pain in chest area  Bloody discharge from nose or mouth  Shortness of breath      Follow-up Instructions:   Call Dr. Freedom oSfia for any questions or problems at [address]:   Your colonoscopy showed three polyps, which were removed. We will contact you about the pathology results and when your next colonoscopy will be due. Please maintain a high fiber diet. Telephone # [phone]      Signed By: Willis Miles.  Tonia Hagan MD     7/11/2019  12:22 PM       DISCHARGE SUMMARY from Nurse    The following personal items collected during your admission are returned to you:   Dental Appliance: Dental Appliances: None  Vision: Visual Aid: Glasses  Hearing Aid:    Jewelry:    Clothing:    Other Valuables:    Valuables sent to safe:

## 2019-07-11 NOTE — ANESTHESIA POSTPROCEDURE EVALUATION
Post-Anesthesia Evaluation and Assessment    Patient: Isma Drummond MRN: [id number]  SSN: [id number]    YOB: 1945  Age: 69 y.o. Sex: female      I have evaluated the patient and they are stable and ready for discharge from the PACU. Cardiovascular Function/Vital Signs  Visit Vitals  /59   Pulse 81   Temp 36.4 °C (97.5 °F)   Resp 20   Wt 59 kg (130 lb)   SpO2 100%   BMI 23.03 kg/m²       Patient is status post MAC anesthesia for Procedure(s):  COLONOSCOPY  ENDOSCOPIC POLYPECTOMY  INJECTION  RESOLUTION CLIP. Nausea/Vomiting: None    Postoperative hydration reviewed and adequate. Pain:  Pain Scale 1: Numeric (0 - 10) (07/11/19 1237)  Pain Intensity 1: 0 (07/11/19 1237)   Managed    Neurological Status: At baseline    Mental Status, Level of Consciousness: Alert and  oriented to person, place, and time    Pulmonary Status:   O2 Device: Room air (07/11/19 1237)   Adequate oxygenation and airway patent    Complications related to anesthesia: None    Post-anesthesia assessment completed. No concerns    Signed By: Alexa Choe MD     July 11, 2019              Procedure(s):  COLONOSCOPY  ENDOSCOPIC POLYPECTOMY  INJECTION  RESOLUTION CLIP.     MAC    <BSHSIANPOST>    Vitals Value Taken Time   /59 7/11/2019 12:33 PM   Temp 36.4 °C (97.5 °F) 7/11/2019 12:33 PM   Pulse 81 7/11/2019 12:37 PM   Resp 20 7/11/2019 12:37 PM   SpO2 100 % 7/11/2019 12:37 PM

## 2019-07-11 NOTE — H&P
[address], [address] [place] [address]      History and Physical       NAME:  Favio Baum   :   1945   MRN:   [id number]             History of Present Illness:  Patient is a 69 y.o. who is seen for positive Cologuard test.     PMH:  Past Medical History:   Diagnosis Date    Arthritis     spine    Ductal carcinoma in situ (DCIS) of right breast     lumpectomy x2, XRT (followed by Dr. Juan Quesada)    History of colon polyps     Osteopenia 2016    mild, R fem neck -1.0       PSH:  Past Surgical History:   Procedure Laterality Date    HX BLADDER SUSPENSION      HX BREAST LUMPECTOMY      x2 (right)    HX COLONOSCOPY      normal (but has had a h/o colon polyps), repeat q5 yrs, Dr. Kole Matamoros       Allergies: Allergies   Allergen Reactions    Penicillins Anaphylaxis       Home Medications:  Prior to Admission Medications   Prescriptions Last Dose Informant Patient Reported? Taking?   calcium carbonate (TUMS) 200 mg calcium (500 mg) Chew 2019 at Unknown time  No Yes   Sig: Take 1 Tab by mouth daily.       Facility-Administered Medications: None       Hospital Medications:  Current Facility-Administered Medications   Medication Dose Route Frequency    0.9% sodium chloride infusion  50 mL/hr IntraVENous CONTINUOUS    sodium chloride (NS) flush 5-40 mL  5-40 mL IntraVENous Q8H    sodium chloride (NS) flush 5-40 mL  5-40 mL IntraVENous PRN    fentaNYL citrate (PF) injection  mcg   mcg IntraVENous Multiple    naloxone (NARCAN) injection 0.4 mg  0.4 mg IntraVENous Multiple    flumazenil (ROMAZICON) 0.1 mg/mL injection 0.2 mg  0.2 mg IntraVENous Multiple    simethicone (MYLICON) 47EG/0.2JF oral drops 80 mg  1.2 mL Oral Multiple    atropine injection 0.5 mg  0.5 mg IntraVENous ONCE PRN    EPINEPHrine (ADRENALIN) 0.1 mg/mL syringe 1 mg  1 mg Endoscopically ONCE PRN       Social History:  Social History     Tobacco Use    Smoking status: Never Smoker    Smokeless tobacco: Never Used   Substance Use Topics    Alcohol use: No       Family History:  Family History   Problem Relation Age of Onset    Cancer Mother         breast, bone    Cancer Father         kidney    No Known Problems Sister              Review of Systems:      Constitutional: negative fever, negative chills, negative weight loss  Eyes:   negative visual changes  ENT:   negative sore throat, tongue or lip swelling  Respiratory:  negative cough, negative dyspnea  Cards:  negative for chest pain, palpitations, lower extremity edema  GI:   See HPI  :  negative for frequency, dysuria  Integument:  negative for rash and pruritus  Heme:  negative for easy bruising and gum/nose bleeding  Musculoskel: negative for myalgias,  back pain and muscle weakness  Neuro: negative for headaches, dizziness, vertigo  Psych:  negative for feelings of anxiety, depression       Objective:     Patient Vitals for the past 8 hrs:   BP Temp Pulse SpO2 Weight   07/11/19 1122 124/80 97.7 °F (36.5 °C) 97 97 % 59 kg (130 lb)     No intake/output data recorded. No intake/output data recorded. EXAM:     NEURO-a&o   HEENT-wnl   LUNGS-clear    COR-regular rate and rhythym     ABD-soft , no tenderness, no rebound, good bs     EXT-no edema     Data Review     No results for input(s): WBC, HGB, HCT, PLT, HGBEXT, HCTEXT, PLTEXT in the last 72 hours. No results for input(s): NA, K, CL, CO2, BUN, CREA, GLU, PHOS, CA in the last 72 hours. No results for input(s): SGOT, GPT, AP, TBIL, TP, ALB, GLOB, GGT, AML, LPSE in the last 72 hours. No lab exists for component: AMYP, HLPSE  No results for input(s): INR, PTP, APTT in the last 72 hours.     No lab exists for component: INREXT       Assessment:   · Positive Cologuard test     Patient Active Problem List   Diagnosis Code    Sciatica M54.30    DCIS (ductal carcinoma in situ) D05.10    S/P DAMIR-BSO Z90.710, Z90.722, Z90.79    Hx of colonic polyps Z86.010    Hx of lumpectomy Z98.890    Advanced care planning/counseling discussion Z71.89    Osteopenia M85.80     Plan:   · Endoscopic procedure with MAC     Signed By: Trice Williamson.  Jina Valencia MD     7/11/2019  11:35 AM

## 2019-07-11 NOTE — PROGRESS NOTES

## 2019-07-11 NOTE — ROUTINE PROCESS
Jarek Hamilton 1945 
[id number] Situation: 
Verbal report received from: CHEYENNE Fu Procedure: Procedure(s): 
COLONOSCOPY 
ENDOSCOPIC POLYPECTOMY INJECTION 
RESOLUTION CLIP Background: 
 
Preoperative diagnosis: POSITIVE COLOGUARD TEST Postoperative diagnosis: 1. colon polyps :  Dr. Zheng Encarnacion Assistant(s): Endoscopy Technician-1: Elgin Lemos Endoscopy RN-1: Jennifer Nava RN Specimens:  
ID Type Source Tests Collected by Time Destination 1 : cecal polyp Preservative Cecum  Joie Garcia MD 7/11/2019 1202 Pathology 2 : ascending colon polyp Preservative   Joie Garcia MD 7/11/2019 1204 Pathology 3 : rectal polyp Preservative Rectum  Joie Garcia MD 7/11/2019 1220 Pathology H. Pylori  no Assessment: 
Intra-procedure medications Anesthesia gave intra-procedure sedation and medications, see anesthesia flow sheet yes Intravenous fluids: 250  NS @ Marcellus Marquez Vital signs stable yes Abdominal assessment: round and soft yes Recommendation: 
Discharge patient per MD order yes. Return to floor no Family or Friend : mother Permission to share finding with family or friend yes

## 2019-07-12 ENCOUNTER — TELEPHONE (OUTPATIENT)
Dept: INTERNAL MEDICINE CLINIC | Age: 74
End: 2019-07-12

## 2019-07-12 NOTE — TELEPHONE ENCOUNTER
Patient would like a doctor referral to a osteopathic doctor. She fell a while ago and her knee is bulging. She does not want to go to an orthopedist as they would suggest surgery and she does not want to do that. Please call her back at [phone]

## 2019-07-14 NOTE — TELEPHONE ENCOUNTER
Would she consider a joint injection in the knee from orthopedics? If not, Physical Therapy is the best way to go    Could you give her a call?     Maral Ace NP

## 2019-07-15 ENCOUNTER — OFFICE VISIT (OUTPATIENT)
Dept: INTERNAL MEDICINE CLINIC | Age: 74
End: 2019-07-15

## 2019-07-15 VITALS
TEMPERATURE: 97.8 F | HEART RATE: 87 BPM | BODY MASS INDEX: 24.27 KG/M2 | HEIGHT: 63 IN | OXYGEN SATURATION: 98 % | DIASTOLIC BLOOD PRESSURE: 60 MMHG | SYSTOLIC BLOOD PRESSURE: 122 MMHG | RESPIRATION RATE: 16 BRPM | WEIGHT: 137 LBS

## 2019-07-15 DIAGNOSIS — M25.561 RIGHT KNEE PAIN, UNSPECIFIED CHRONICITY: Primary | ICD-10-CM

## 2019-07-15 RX ORDER — MELOXICAM 7.5 MG/1
7.5 TABLET ORAL DAILY
Qty: 30 TAB | Refills: 0 | Status: SHIPPED | OUTPATIENT
Start: 2019-07-15 | End: 2019-07-29

## 2019-07-15 NOTE — PROGRESS NOTES
Subjective:      Yimi Garrido is a 69 y.o. female who presents today for evaluation of right knee pain    Began in April after falling down the stairs and landing on knee  Initially swollen and painful, could barely walk on it  Took about 2-3 weeks for swelling to go down, but is still mildly swollen  Still having medial knee pain    Had initially stopped yoga and working out for 2 weeks, then restarted because was getting more stiff  Does the elliptical and rower, feels that it helps her  Knee pain worsening while laying in bed    Pain not that bad, but not as much    No erythema  No fever or chills      Patient Active Problem List    Diagnosis Date Noted    Osteopenia 09/01/2016    Advanced care planning/counseling discussion 08/06/2016    Hx of colonic polyps 10/18/2013    Hx of lumpectomy 10/18/2013    DCIS (ductal carcinoma in situ) 03/04/2013    S/P DAMIR-BSO 03/04/2013    Sciatica 09/20/2011        Review of Systems    Pertinent items are noted in HPI. Objective:     Visit Vitals  /60 (BP 1 Location: Left arm, BP Patient Position: Sitting)   Pulse 87   Temp 97.8 °F (36.6 °C) (Oral)   Resp 16   Ht 5' 3\" (1.6 m)   Wt 137 lb (62.1 kg)   SpO2 98%   BMI 24.27 kg/m²     General appearance: alert, cooperative, no distress, appears stated age  Head: Normocephalic, without obvious abnormality, atraumatic  Extremities: mild generalized swelling of right knee, no crepitus, steady gait  Skin: no erythema, bruising, or hematomas  Neurologic: Grossly normal  Psych: calm, cooperative, appropriate affect      Assessment/Plan:     1. Right knee pain, unspecified chronicity  - meloxciam daily x 14 days  - tylenol prn breakthrough  - referral to PT and to Ortho if no improvement  - REFERRAL TO ORTHOPEDICS  - REFERRAL TO PHYSICAL THERAPY  - meloxicam (MOBIC) 7.5 mg tablet; Take 1 Tab by mouth daily for 14 days. Dispense: 30 Tab;  Refill: 0      Advised her to call back or return to office if symptoms worsen/change/persist.  Discussed expected course/resolution/complications of diagnosis in detail with patient. Medication risks/benefits/costs/interactions/alternatives discussed with patient. She was given an after visit summary which includes diagnoses, current medications, & vitals. She expressed understanding with the diagnosis and plan.     KAMI Foy

## 2019-07-15 NOTE — TELEPHONE ENCOUNTER
Pt reports that she was scheduled to see the provider 6-8 weeks after the incident. She reports that she did not follow up with the appointment because the pain became better. At this time she notes the pain is not \"the actual knee\", but the inner part of the knee and she believes it's muscle related. Pt requested to be seen in office for her concerns to further discuss proper treatment option.  Appointment scheduled today with NP.

## 2019-07-15 NOTE — PATIENT INSTRUCTIONS
meloxicam 7.5mg once a day    Tylenol Extra Strength, 2 tabs as needed for pain (maximum of 6 tabs a day)    Follow up with Ortho if no improvement

## 2019-07-22 ENCOUNTER — HOSPITAL ENCOUNTER (OUTPATIENT)
Dept: PHYSICAL THERAPY | Age: 74
Discharge: HOME | End: 2019-07-22
Payer: MEDICARE

## 2019-07-22 PROCEDURE — 97110 THERAPEUTIC EXERCISES: CPT | Performed by: PHYSICAL THERAPIST

## 2019-07-22 PROCEDURE — 97161 PT EVAL LOW COMPLEX 20 MIN: CPT | Performed by: PHYSICAL THERAPIST

## 2019-07-22 NOTE — PROGRESS NOTES
[facility]  [address] [facility], [address] [place] [address]  Phone: [phone]  Fax: [phone]    Plan of Care/Statement of Necessity for Physical Therapy Services  2-15    Patient name: Sury Campbell  : 1945  Provider#: [id number]  Referral source: Jose Danile Newsome NP      Medical/Treatment Diagnosis: Right knee pain [M25.561]     Prior Hospitalization: see medical history     Comorbidities: see eval.  Prior Level of Function: see eval.   Medications: Verified on Patient Summary List  Start of Care: see eval.          Onset Date: see eval.       The Plan of Care and following information is based on the information from the initial evaluation. Assessment/ key information: Pt is a 70 yo female with right medial knee pain that began a fall 2019. Pt with increased pain, decreased strength, increased TTP and difficulty with negotiating stairs and difficulty sleeping. Treatment Plan may include any combination of the following: Therapeutic exercise, Therapeutic activities, Neuromuscular re-education, Physical agent/modality, Gait/balance training, Manual therapy and Patient education   Patient / Family readiness to learn indicated by: asking questions, trying to perform skills and interest  Persons(s) to be included in education: patient (P)  Barriers to Learning/Limitations: None  Patient Goal (s): \"see eval  Patient Self Reported Health Status: good  Rehabilitation Potential: good    Short Term Goals: To be accomplished in 2-3 weeks:   1) Pt independent in HEP from day 1   2) Pt will report 25% reduction in pain while sleeping at night. Long Term Goals: To be accomplished in 4-6 weeks:   1) Pt independent in final HEP. 2) Pt will report she no longer is experiencing knee pain while sleeping or knee pain that disrupts her sleep.     3) Pt able to negotiate 4 stairs in clinic without increased knee pain and with good eccentric control R LE           Frequency / Duration: Patient to be seen 2 times per week for 4-6 weeks. Patient/ Caregiver education and instruction: self care and exercises    [x]  Plan of care has been reviewed with PTA    Certification Period:   7/22/2019 - 10 /20/19      Isa Campbell PT DPT,OCS 7/22/2019 8:29 AM    _______________________________________________________________________    I certify that the above Therapy Services are being furnished while the patient is under my care. I agree with the treatment plan and certify that this therapy is necessary.     [de-identified] Signature:____________________  Date:____________Time: _________    ___

## 2019-07-22 NOTE — PROGRESS NOTES
[facility] AT [facility]  [address], [place], [address]  Phone: [phone]  Fax: [phone]      PT INITIAL EVALUATION NOTE - [facility] 2-15    Patient Name: Carmela Castellon  Date:2019  : 1945  [x]  Patient  Verified  Payor: Isa Rodriguez / Plan: [place] MED[place] PART A & B / Product Type: Medicare /    In time:835  Out time:930  Total Treatment Time (min): 55  Total Timed Codes (min): 25  1:1 Treatment Time ( only): 25   Visit #: 1     Treatment Area: Right knee pain [M25.561]    SUBJECTIVE  Any medication changes, allergies to medications, adverse drug reactions, diagnosis change, or new procedure performed?: [] No    [x] Yes (see summary sheet for update)    Current symptoms/chief complaint: right knee pain (medial). Date of onset/injury: 19. Pt reports she fell on her right knee after a trip from [facility]. She had a laundry basket full of clothes, she thought she was on her last step but she wasn't, landed on her right knee. Couldn't move for a couple minutes as it was so painful. She was then hobbling around, it was swollen, it was painful. She loves yoga, goes to the gym. She stopped everything for 2 weeks. She was worried about driving due to the pain but drove very slowly. She was using hot and cold compresses. She used ibuprofen as she was having difficulty sleeping as the pain was so bad. She had more pain laying on left side due to contact of inside right knee on the left. She did make an appointment with Dr. Jero Rubin. It then improved and she ended up cancelling that appointment. However, it then became worse again and began to disrupt her sleep. She then went to see NP last week. NP referred her to PT. She thinks there is still some swelling on inside of her knee. Aggravated by: stillness - laying on the sofa and then going up and move (but not as much as it used to be).   Meloxicam    Eased by: Exercises  Meloxicam    Pain:   10/10 max 0-1/10 min 1/10 now       Location and description of symptoms: right knee medial knee pain. Diagnostic Tests: no imaging. PMHX: Significant for total hysterectomy. Social/Recreation/Work: Retired. Pt reports she is able to still go to the gym and do yoga, standing elliptical, rowing machine. She goes to yoga 2x/week. She does use the blankets for her knees. Prior level of function: prior to fall she did not have any pain or difficulty sleeping due to right knee pain. Patient goal(s): \"exercise to strengthen muscles in knee, better support. \"  Wants to know \"am I doing the right thing, is there anything else I can do. \"      Objective:    Posture/Observations: slight right knee flexion, slight right calf atrophy. Gait: No significant deviations in gait with walking. Stairs:   Mild decrease in eccentric control on the right, pt notes mild pain. Pt reports she is also very conscious as she never wants to fall again. ROM:  AROM: RIGHT: Extension +1 deg (hyperext). Flexion 142 deg. LEFT: +2 hyperext, flexion 142 deg. Strength:  Quad:  Left 5/5, right 4+/5, pt notes she has to put forth more effort. Right hip abduction 4/5. Functional Biomechanical Screen  SLS:Able to hold for 30 \" on either side but with UE abducted slightly. Single Limb Squat:NT                               Palpation:  Tenderness to palpation: TTP medial TF joint line, MCL. Joint Mobility:  Slight hypomobility right PF joint as compared to left. Optional Tests  Melo's:  [] Neg    [x] Pos  Valgus:  [] Neg    [x] Pos for pain but stable  Hamstring 90/90 Test: [] Neg    [x] Pos restricted more on the right as compared to left. Other tests/ comments:    Outcome Measure: Patient presents with a FOTO intake summary score of  Next visit.       OBJECTIVE    25 min Therapeutic Exercise:  [x] See flow sheet : Encouraged pt to continue with meloxicam per NP's Rx as long as no side effects to decrease pain and inflammation. Discussed sleep posture. Pt performed all HEP per sheet in chart. Rationale: increase strength, improve coordination and increase proprioception to improve the patients ability to sleep without pain, negotiate stairs without pain. With   [] TE   [] TA   [] neuro   [] other: Patient Education: [x] Review HEP    [] Progressed/Changed HEP based on:   [] positioning   [] body mechanics   [] transfers   [] heat/ice application    [] other:        Pain Level (0-10 scale) post treatment: 0, pt to ice at home.       Assessment: See POC    Plan: See POC    Wes Jurado, PT, DPT, OCS    7/22/2019    8:29 AM

## 2019-07-25 ENCOUNTER — HOSPITAL ENCOUNTER (OUTPATIENT)
Dept: PHYSICAL THERAPY | Age: 74
Discharge: HOME | End: 2019-07-25
Payer: MEDICARE

## 2019-07-25 PROCEDURE — 97110 THERAPEUTIC EXERCISES: CPT | Performed by: PHYSICAL THERAPIST

## 2019-07-25 NOTE — PROGRESS NOTES
PT DAILY TREATMENT NOTE 2-15    Patient Name: Alissa Owens  Date:2019  : 1945  [x]  Patient  Verified  Payor: Allen Thomas / Plan: [place] MEDICARE PART A & B / Product Type: Medicare /    In time:330  Out time:425  Total Treatment Time (min): 55  Total Timed Codes (min): 55  1:1 Treatment Time ([facility] only): 55   Visit #: 2      Treatment Area: Right knee pain [M25.561]    SUBJECTIVE  Pain Level (0-10 scale), subjective functional status/changes: Pt reports pain is 0/10. Pt reports she does notice when she is trying to do her exercises on the left leg she notices it is much easier and no discomfort. Any medication changes, allergies to medications, adverse drug reactions, diagnosis change, or new procedure performed?: [x] No    [] Yes (see summary sheet for update) SEE ABOVE. OBJECTIVE     -     - min Modality:      []  Ice     []  Heat       Position/location:   -   Rationale: decrease pain to improve the patients ability to do functional activities   [x] Skin assessment post-treatment:  [x]intact []redness- no adverse reaction    []redness  adverse reaction:      55 min Therapeutic Exercise:  [x] See flow sheet : added per flow sheet. Rationale: increase strength, improve coordination and increase proprioception to improve the patients ability to negotiating stairs    - min Manual Therapy:  -   Rationale: decrease pain, increase tissue extensibility and decrease trigger points  to improve the patients ability to -          With   [] TE   [] TA   [] neuro   [] other: Patient Education: [x] Review HEP    [] Progressed/Changed HEP based on:   [] positioning   [] body mechanics   [] transfers   [] heat/ice application    [] other:        Pain Level (0-10 scale) post treatment: 0    ASSESSMENT/Changes in Function:   Pt without pain throughout session today but needed cues to perform therapeutic exercises correctly.     Patient will continue to benefit from skilled PT services to modify and progress therapeutic interventions, address functional mobility deficits, address ROM deficits, address strength deficits and instruct in home and community integration to attain remaining goals. Progress towards goals / Updated goals:  Goals were not re-assessed today.      PLAN      [x]  Continue plan of care    []  Other:_      Jemima Cee, PT DPT, OCS 7/25/2019  4:27 PM       PT License #[id number]

## 2019-07-29 ENCOUNTER — HOSPITAL ENCOUNTER (OUTPATIENT)
Dept: PHYSICAL THERAPY | Age: 74
Discharge: HOME | End: 2019-07-29
Payer: MEDICARE

## 2019-07-29 PROCEDURE — 97110 THERAPEUTIC EXERCISES: CPT | Performed by: PHYSICAL THERAPY ASSISTANT

## 2019-07-29 NOTE — PROGRESS NOTES
PT DAILY TREATMENT NOTE 2-15    Patient Name: Sury Campbell  Date:2019  : 1945  [x]  Patient  Verified  Payor: Rajani Neighbours / Plan: [place] MEDICARE PART A & B / Product Type: Medicare /    In time:9:35 AM  Out time:10:20 AM  Total Treatment Time (min): 45  Total Timed Codes (min): 45  1:1 Treatment Time ( [address] only): 35   Visit #: 3      Treatment Area: Right knee pain [M25.561]    SUBJECTIVE  Pain Level (0-10 scale), subjective functional status/changes: Pt reports pain is 0/10. States she volunteers on  which involves prolonged standing and notes increased pain at the end of they day. She also has increased knee pain navigating the stairs. Overall she feels she has improved but \"it's not there yet. \" States she ices inconsistently and will try to do that more. Any medication changes, allergies to medications, adverse drug reactions, diagnosis change, or new procedure performed?: [x] No    [] Yes (see summary sheet for update) SEE ABOVE. OBJECTIVE     -     - min Modality:      []  Ice     []  Heat       Position/location:   -   Rationale: decrease pain to improve the patients ability to do functional activities   [x] Skin assessment post-treatment:  [x]intact []redness- no adverse reaction    []redness  adverse reaction:      45 min Therapeutic Exercise:  [x] See flow sheet : added per flow sheet.     Rationale: increase strength, improve coordination and increase proprioception to improve the patients ability to negotiating stairs    - min Manual Therapy:  -   Rationale: decrease pain, increase tissue extensibility and decrease trigger points  to improve the patients ability to -          With   [] TE   [] TA   [] neuro   [] other: Patient Education: [x] Review HEP    [] Progressed/Changed HEP based on:   [] positioning   [] body mechanics   [] transfers   [] heat/ice application    [] other:        Pain Level (0-10 scale) post treatment: 0    ASSESSMENT/Changes in Function:   Cues for weight thru mid foot and to avoid femoral adduction during step ups, use of mirror for visual feedback. Challenged with progressed balance exercises. Patient will continue to benefit from skilled PT services to modify and progress therapeutic interventions, address functional mobility deficits, address ROM deficits, address strength deficits and instruct in home and community integration to attain remaining goals. Progress towards goals / Updated goals:  Goals were not re-assessed today.      PLAN      [x]  Continue plan of care    []  Other:_      Nathaniel Peterson, ROSMERY  7/29/2019  10:20 AM

## 2019-08-01 ENCOUNTER — HOSPITAL ENCOUNTER (OUTPATIENT)
Dept: PHYSICAL THERAPY | Age: 74
Discharge: HOME | End: 2019-08-01
Payer: MEDICARE

## 2019-08-01 PROCEDURE — 97110 THERAPEUTIC EXERCISES: CPT | Performed by: PHYSICAL THERAPY ASSISTANT

## 2019-08-01 NOTE — PROGRESS NOTES
PT DAILY TREATMENT NOTE 2-15    Patient Name: Carmela Castellon  Date:2019  : 1945  [x]  Patient  Verified  Payor: Isa Rodriguez / Plan: [place] MEDICARE PART A & B / Product Type: Medicare /    In time: 10:30 AM  Out time:11:30 AM  Total Treatment Time (min): 60  Total Timed Codes (min): 60  1:1 Treatment Time ( [address] only): 30   Visit #: 4     Treatment Area: Right knee pain [M25.561]    SUBJECTIVE  Pain Level (0-10 scale), subjective functional status/changes: Pt reports pain is 0/10, she is now able to sleep thru the night without waking due to R knee pain. States she is going to volunteer for 4 hours this weekend at [facility], she is a little concerned with how her R knee will do. Any medication changes, allergies to medications, adverse drug reactions, diagnosis change, or new procedure performed?: [x] No    [] Yes (see summary sheet for update) SEE ABOVE. OBJECTIVE     -     - min Modality:      []  Ice     []  Heat       Position/location:   -   Rationale: decrease pain to improve the patients ability to do functional activities   [x] Skin assessment post-treatment:  [x]intact []redness- no adverse reaction    []redness  adverse reaction:      60 min Therapeutic Exercise:  [x] See flow sheet : added tandem balance on foam, lateral step up and overs 4 inch,    Rationale: increase strength, improve coordination and increase proprioception to improve the patients ability to negotiating stairs    - min Manual Therapy:  -   Rationale: decrease pain, increase tissue extensibility and decrease trigger points  to improve the patients ability to -          With   [] TE   [] TA   [] neuro   [] other: Patient Education: [x] Review HEP    [] Progressed/Changed HEP based on:   [] positioning   [] body mechanics   [] transfers   [] heat/ice application    [x] other: suggested patient try a neoprene sleeve to reduce effusion R knee while volunteering over the weekend.        Pain Level (0-10 scale) post treatment: 0    ASSESSMENT/Changes in Function:   Minor cues to avoid femoral adduction during chop/lift exercises on airex foam. Attempted mini squats but patient noting significant crepitus but no report of pain. Discontinued after 6 reps. Overall tolerated session well. Patient will continue to benefit from skilled PT services to modify and progress therapeutic interventions, address functional mobility deficits, address ROM deficits, address strength deficits and instruct in home and community integration to attain remaining goals. Progress towards goals / Updated goals:  Goals were not re-assessed today.      PLAN      [x]  Continue plan of care    []  Other:_      Asha Sepulveda PTA  8/1/2019  10:30 AM

## 2019-08-05 ENCOUNTER — HOSPITAL ENCOUNTER (OUTPATIENT)
Dept: PHYSICAL THERAPY | Age: 74
Discharge: HOME | End: 2019-08-05
Payer: MEDICARE

## 2019-08-05 PROCEDURE — 97110 THERAPEUTIC EXERCISES: CPT | Performed by: PHYSICAL THERAPIST

## 2019-08-08 ENCOUNTER — HOSPITAL ENCOUNTER (OUTPATIENT)
Dept: PHYSICAL THERAPY | Age: 74
Discharge: HOME | End: 2019-08-08
Payer: MEDICARE

## 2019-08-08 PROCEDURE — 97110 THERAPEUTIC EXERCISES: CPT | Performed by: PHYSICAL THERAPIST

## 2019-08-08 NOTE — PROGRESS NOTES
PT DAILY TREATMENT NOTE 2-15    Patient Name: Galo Varela  Date:2019  : 1945  [x]  Patient  Verified  Payor: Audie Yaquelin / Plan: [place] MEDICARE PART A & B / Product Type: Medicare /    In time: 1000 AM  Out time:  1100  AM  Total Treatment Time (min): 60  Total Timed Codes (min): 60  1:1 Treatment Time [facility] only):55    Visit #: 6    Treatment Area: Right knee pain [M25.561]    SUBJECTIVE    Pain Level (0-10 scale), subjective functional status/changes: Pt reports pain is 0/10 but she is still having some pain in her knee. Overall, it feels 95% better but she is frustrated as she is still having pain. Pt reports sometimes she still does have pain with stairs but it is much less severe than it was before. Pt reports she does still have some twinges with sleep. Any medication changes, allergies to medications, adverse drug reactions, diagnosis change, or new procedure performed?: [x] No    [] Yes (see summary sheet for update) SEE ABOVE. OBJECTIVE     -     - min Modality:      []  Ice     []  Heat       Position/location:   -   Rationale: decrease pain to improve the patients ability to do functional activities   [x] Skin assessment post-treatment:  [x]intact []redness- no adverse reaction    []redness  adverse reaction:      60 min Therapeutic Exercise:  [x] See flow sheet : Added G TB to bridges.       Rationale: increase strength, improve coordination and increase proprioception to improve the patients ability to negotiating stairs    - min Manual Therapy:  -   Rationale: decrease pain, increase tissue extensibility and decrease trigger points  to improve the patients ability to -          With   [] TE   [] TA   [] neuro   [] other: Patient Education: [x] Review HEP    [] Progressed/Changed HEP based on:   [] positioning   [] body mechanics   [] transfers   [] heat/ice application    [x] other: suggested patient try a neoprene sleeve to reduce effusion R knee while volunteering over the weekend. Pain Level (0-10 scale) post treatment: 0    ASSESSMENT/Changes in Function:   Pt needing cues to decrease mild knee pain on elliptical and step ups. Cued for inc. Push through heel versus ball of her foot to utilize her gluteals. Pt noted less discomfort with this adjustment. Also, pt noting increased fatigue with side steps with cues for correct technique. Pt to return to orthopedist this afternoon. Patient will continue to benefit from skilled PT services to modify and progress therapeutic interventions, address functional mobility deficits, address ROM deficits, address strength deficits and instruct in home and community integration to attain remaining goals. Short Term Goals: To be accomplished in 2-3 weeks:              1) Pt independent in HEP from day 1              2) Pt will report 25% reduction in pain while sleeping at night.     Long Term Goals: To be accomplished in 4-6 weeks:              1) Pt independent in final HEP. 2) Pt will report she no longer is experiencing knee pain while sleeping or knee pain that disrupts her sleep. 3) Pt able to negotiate 4 stairs in clinic without increased knee pain and with good eccentric control R LE                        PLAN      [x]  Continue plan of care, re-eval 08/22 or earlier, f/u with pt regarding appt.  With ortho    []  Other:_      Mona Maldonado, PT  8/8/2019  10:30 AM

## 2019-08-12 ENCOUNTER — HOSPITAL ENCOUNTER (OUTPATIENT)
Dept: PHYSICAL THERAPY | Age: 74
Discharge: HOME | End: 2019-08-12
Payer: MEDICARE

## 2019-08-12 PROCEDURE — 97110 THERAPEUTIC EXERCISES: CPT | Performed by: PHYSICAL THERAPY ASSISTANT

## 2019-08-12 NOTE — PROGRESS NOTES
PT DAILY TREATMENT NOTE 2-15    Patient Name: Rosemarie Klein  Date:2019  : 1945  [x]  Patient  Verified  Payor: VA MEDICARE / Plan: VA MEDICARE PART A & B / Product Type: Medicare /    In time: 9:30  AM  Out time:  10:25  AM  Total Treatment Time (min): 55  Total Timed Codes (min): 55  1:1 Treatment Time [facility] only):55    Visit #: 7    Treatment Area: Right knee pain [M25.561]    SUBJECTIVE    Pain Level (0-10 scale), subjective functional status/changes: Pt reports pain is 0/10 currently, states she saw her MD on Thursday who told her she had arthritis that may have been flared up when she fell but otherwise her R knee looked normal. However she still has some pain ascending/descending the stairs \"but that may be something I just may have to live with. \"  States she also volunteered over the weekend \"My knee felt better than usual, they had shallow stairs that I could practice my step ups with and it didn't bother my knee. \" \"    Any medication changes, allergies to medications, adverse drug reactions, diagnosis change, or new procedure performed?: [x] No    [] Yes (see summary sheet for update) SEE ABOVE.        OBJECTIVE     -     - min Modality:      []  Ice     []  Heat       Position/location:   -   Rationale: decrease pain to improve the patients ability to do functional activities   [x] Skin assessment post-treatment:  [x]intact []redness- no adverse reaction    []redness  adverse reaction:      55 min Therapeutic Exercise:  [x] See flow sheet : increased reps per flow sheet      Rationale: increase strength, improve coordination and increase proprioception to improve the patients ability to negotiating stairs    - min Manual Therapy:  -   Rationale: decrease pain, increase tissue extensibility and decrease trigger points  to improve the patients ability to -          With   [] TE   [] TA   [] neuro   [] other: Patient Education: [x] Review HEP    [] Progressed/Changed HEP based on:   [] positioning   [] body mechanics   [] transfers   [] heat/ice application    [] other:        Pain Level (0-10 scale) post treatment: 0    ASSESSMENT/Changes in Function:   Tolerated progressed exercises well, continued to reviewed mechanics with step ups to reduce stress thru R knee and to engage gluteals. Patient planning to d/c next week. Patient will continue to benefit from skilled PT services to modify and progress therapeutic interventions, address functional mobility deficits, address ROM deficits, address strength deficits and instruct in home and community integration to attain remaining goals. Short Term Goals: To be accomplished in 2-3 weeks:              1) Pt independent in HEP from day 1              2) Pt will report 25% reduction in pain while sleeping at night.     Long Term Goals: To be accomplished in 4-6 weeks:              1) Pt independent in final HEP. 2) Pt will report she no longer is experiencing knee pain while sleeping or knee pain that disrupts her sleep.                3) Pt able to negotiate 4 stairs in clinic without increased knee pain and with good eccentric control R LE                        PLAN      [x]  Continue plan of care, re-eval 08/22 or earlier    []  Other:_      Loyd Sanchez, PTA  8/12/2019  9:30 AM

## 2019-08-15 ENCOUNTER — HOSPITAL ENCOUNTER (OUTPATIENT)
Dept: PHYSICAL THERAPY | Age: 74
Discharge: HOME | End: 2019-08-15
Payer: MEDICARE

## 2019-08-15 PROCEDURE — 97110 THERAPEUTIC EXERCISES: CPT | Performed by: PHYSICAL THERAPIST

## 2019-08-19 ENCOUNTER — HOSPITAL ENCOUNTER (OUTPATIENT)
Dept: PHYSICAL THERAPY | Age: 74
Discharge: HOME | End: 2019-08-19
Payer: MEDICARE

## 2019-08-19 PROCEDURE — 97110 THERAPEUTIC EXERCISES: CPT | Performed by: PHYSICAL THERAPIST

## 2019-08-19 PROCEDURE — 97110 THERAPEUTIC EXERCISES: CPT

## 2019-08-19 NOTE — PROGRESS NOTES
PT DAILY TREATMENT NOTE 2-15    Patient Name: Kerry Everett  Date:2019  : 1945  [x]  Patient  Verified  Payor: Yulia Bartlett / Plan: [place] / Product Type: Medicare /    In time: 945 AM  Out time: 1030   Total Treatment Time (min): 45  Total Timed Codes (min): 45  1:1 Treatment Time ( [address] only): 45    Visit #: 9    Treatment Area: Right knee pain [M25.561]    SUBJECTIVE:    Pt 15 ' late to tx session, reports she was stuck in traffic. Pain Level (0-10 scale), subjective functional status/changes: Pt reports 0/10 pain. Pt reports that she is still having pain at times on stairs but pushes through the heel and pain is less. Any medication changes, allergies to medications, adverse drug reactions, diagnosis change, or new procedure performed?: [x] No    [] Yes (see summary sheet for update) SEE ABOVE. OBJECTIVE     -     - min Modality:      []  Ice     []  Heat       Position/location:   -   Rationale: decrease pain to improve the patients ability to do functional activities   [x] Skin assessment post-treatment:  [x]intact []redness- no adverse reaction    []redness  adverse reaction:      45 min Therapeutic Exercise:  [x] See flow sheet :      30 : 1:1 spent with Andrewdianne Yuniel PTA    Rationale: increase strength, improve coordination and increase proprioception to improve the patients ability to negotiating stairs    - min Manual Therapy:  -   Rationale: decrease pain, increase tissue extensibility and decrease trigger points  to improve the patients ability to -          With   [] TE   [] TA   [] neuro   [] other: Patient Education: [x] Review HEP    [] Progressed/Changed HEP based on:   [] positioning   [] body mechanics   [] transfers   [] heat/ice application    [] other:        Pain Level (0-10 scale) post treatment: 0    ASSESSMENT/Changes in Function:   Pt still needing cues at times to perform side steps with t-band and S/L hip ABD correctly.   Pt with decreased pain overall. Pt required tactile cues for proper form of SLR hip ABD. Patient will continue to benefit from skilled PT services to modify and progress therapeutic interventions, address functional mobility deficits, address ROM deficits, address strength deficits and instruct in home and community integration to attain remaining goals. Short Term Goals: To be accomplished in 2-3 weeks:              1) Pt independent in HEP from day 1              2) Pt will report 25% reduction in pain while sleeping at night.     Long Term Goals: To be accomplished in 4-6 weeks:              1) Pt independent in final HEP. 2) Pt will report she no longer is experiencing knee pain while sleeping or knee pain that disrupts her sleep.                3) Pt able to negotiate 4 stairs in clinic without increased knee pain and with good eccentric control R LE                        PLAN      [x]  Continue plan of care - re- assess next visit    []  Other:_      Laurence Godinez, PT  8/19/2019  9:30 AM

## 2019-08-22 ENCOUNTER — HOSPITAL ENCOUNTER (OUTPATIENT)
Dept: PHYSICAL THERAPY | Age: 74
Discharge: HOME | End: 2019-08-22
Payer: MEDICARE

## 2019-08-22 PROCEDURE — 97110 THERAPEUTIC EXERCISES: CPT | Performed by: PHYSICAL THERAPIST

## 2019-08-22 NOTE — ANCILLARY DISCHARGE INSTRUCTIONS
PT Re-assessment / Discharge Summary / DAILY TREATMENT NOTE 2-15     Patient Name: iNall Padilla  Date:2019  : 1945  [x]  Patient  Verified  Payor: Pietro Cos / Plan: [place] MEDICARE PART A & B / Product Type: Medicare /    In time: 574 AM  Out time: 1046   Total Treatment Time (min): 54  Total Timed Codes (min): 54  1:1 Treatment Time ( [address] only): 53    Visit #: 10     Treatment Area: Right knee pain [M25.561]     SUBJECTIVE:     Pain Level (0-10 scale), subjective functional status/changes: Pt reports 0/10 pain. She does sometimes still has pain on stairs but it is not as severe. Pt notes with thinking of her form she has less pain. Pt reports her pain at it's worst is 1/10. Any medication changes, allergies to medications, adverse drug reactions, diagnosis change, or new procedure performed?: [x] No    [] Yes (see summary sheet for update) SEE ABOVE.         OBJECTIVE      Stairs:   Mild decrease in eccentric control on the right, pt notes mild pain.  Pt reports she is also very conscious as she never wants to fall again.      ROM:  AROM: RIGHT: Flexion 142 deg. No pain        Strength:  Quad:  Right 5/5, no pain.      Right hip abduction 5/5.     Functional Biomechanical Screen  SLS:Able to hold for 30 \" on either side but with UE abducted slightly.      Palpation:  Tenderness to palpation: \"twinge\" medial TF joint line, MCL.       Optional Tests  Melo's:                 [x] Neg    [] Pos  Valgus:                        [x] Neg    [] Pos      - min Modality:      []  Ice     []  Heat       Position/location:   -   Rationale: decrease pain to improve the patients ability to do functional activities   [x] Skin assessment post-treatment:  [x]intact []redness- no adverse reaction    []redness  adverse reaction:                 53 min Therapeutic Exercise:  [x] See flow sheet :  Re-assessment.   Review of final HEP.       Rationale: increase strength, improve coordination and increase proprioception to improve the patients ability to negotiating stairs     - min Manual Therapy:  -   Rationale: decrease pain, increase tissue extensibility and decrease trigger points  to improve the patients ability to -                                                                 With   [] TE   [] TA   [] neuro   [] other: Patient Education: [x] Review HEP    [] Progressed/Changed HEP based on:   [] positioning   [] body mechanics   [] transfers   [] heat/ice application    [] other:          Pain Level (0-10 scale) post treatment: 0     ASSESSMENT/Changes in Function:   Pt with 10 skilled PT visits at this time. Pt notes decreased pain with sleeping and negotiating stairs. She does still have pain at times with stairs but she notes it is less severe overall. Pt notes pain at its worst is 1/10 (at evaluation was 10/10 at its worst). Pt with improved strength and awareness of neutral LQ alignment with squats and stairs. Pt with good understanding of HEP and ready for D/C to HEP.              Short Term Goals: To be accomplished in 2-3 weeks:              1) Pt independent in HEP from day 1 MET               2) Pt will report 25% reduction in pain while sleeping at night. MET      Long Term Goals: To be accomplished in 4-6 weeks:              1) Pt independent in final HEP.  MET              2) Pt will report she no longer is experiencing knee pain while sleeping or knee pain that disrupts her sleep.  MET              3) Pt able to negotiate 4 stairs in clinic without increased knee pain and with good eccentric control R LE MET                PLAN      [x]  D/C to HEP     []  Other:_       Macrina Code, PT   8/22/2019

## 2019-08-22 NOTE — PROGRESS NOTES
PT Re-assessment / Discharge Summary / DAILY TREATMENT NOTE 2-15    Patient Name: Venecia Jin  Date:2019  : 1945  [x]  Patient  Verified  Payor: [facility] / Plan: [place] MEDICARE PART A & B / Product Type: Medicare /    In time: 754 AM  Out time: 1046   Total Treatment Time (min): 54  Total Timed Codes (min): 54  1:1 Treatment Time ( [address] only): 53    Visit #: 10    Treatment Area: Right knee pain [M25.561]    SUBJECTIVE:    Pain Level (0-10 scale), subjective functional status/changes: Pt reports 0/10 pain. She does sometimes still has pain on stairs but it is not as severe. Pt notes with thinking of her form she has less pain. Pt reports her pain at it's worst is 1/10. Any medication changes, allergies to medications, adverse drug reactions, diagnosis change, or new procedure performed?: [x] No    [] Yes (see summary sheet for update) SEE ABOVE. OBJECTIVE     Stairs:   Mild decrease in eccentric control on the right, pt notes mild pain. Pt reports she is also very conscious as she never wants to fall again. ROM:  AROM: RIGHT: Flexion 142 deg. No pain       Strength:  Quad:  Right 5/5, no pain.      Right hip abduction 5/5. Functional Biomechanical Screen  SLS:Able to hold for 30 \" on either side but with UE abducted slightly. Palpation:  Tenderness to palpation: \"twinge\" medial TF joint line, MCL.       Optional Tests  Melo's:                 [x] Neg    [] Pos  Valgus:                        [x] Neg    [] Pos     - min Modality:      []  Ice     []  Heat       Position/location:   -   Rationale: decrease pain to improve the patients ability to do functional activities   [x] Skin assessment post-treatment:  [x]intact []redness- no adverse reaction    []redness  adverse reaction:      53 min Therapeutic Exercise:  [x] See flow sheet :  Re-assessment. Review of final HEP.       Rationale: increase strength, improve coordination and increase proprioception to improve the patients ability to negotiating stairs    - min Manual Therapy:  -   Rationale: decrease pain, increase tissue extensibility and decrease trigger points  to improve the patients ability to -          With   [] TE   [] TA   [] neuro   [] other: Patient Education: [x] Review HEP    [] Progressed/Changed HEP based on:   [] positioning   [] body mechanics   [] transfers   [] heat/ice application    [] other:        Pain Level (0-10 scale) post treatment: 0    ASSESSMENT/Changes in Function:   Pt with 10 skilled PT visits at this time. Pt notes decreased pain with sleeping and negotiating stairs. She does still have pain at times with stairs but she notes it is less severe overall. Pt notes pain at its worst is 1/10 (at evaluation was 10/10 at its worst). Pt with improved strength and awareness of neutral LQ alignment with squats and stairs. Pt with good understanding of HEP and ready for D/C to HEP. Short Term Goals: To be accomplished in 2-3 weeks:              1) Pt independent in HEP from day 1 MET               2) Pt will report 25% reduction in pain while sleeping at night. MET      Long Term Goals: To be accomplished in 4-6 weeks:              1) Pt independent in final HEP. MET              2) Pt will report she no longer is experiencing knee pain while sleeping or knee pain that disrupts her sleep.   MET              3) Pt able to negotiate 4 stairs in clinic without increased knee pain and with good eccentric control R LE MET                          PLAN      [x]  D/C to HEP    []  Other:Mukesh Boo, PT  8/22/2019  9:30 AM

## 2019-08-26 NOTE — ANCILLARY DISCHARGE INSTRUCTIONS
PT Re-assessment / Discharge Summary / DAILY TREATMENT NOTE 2-15     Patient Name: Bhakti Soto  Date:2019  : 1945  [x]  Patient  Verified  Payor: [place] MEDICARE / Plan: [place] MEDICARE PART A & B / Product Type: Medicare /    In time: 953 AM  Out time: 1046   Total Treatment Time (min): 54  Total Timed Codes (min): 54  1:1 Treatment Time ( [address] only): 53    Visit #: 10     Treatment Area: Right knee pain [M25.561]     SUBJECTIVE:     Pain Level (0-10 scale), subjective functional status/changes: Pt reports 0/10 pain.  She does sometimes still has pain on stairs but it is not as severe.  Pt notes with thinking of her form she has less pain.  Pt reports her pain at it's worst is 1/10.    Any medication changes, allergies to medications, adverse drug reactions, diagnosis change, or new procedure performed?: [x] No    [] Yes (see summary sheet for update) SEE ABOVE.         OBJECTIVE      Stairs:   Mild decrease in eccentric control on the right, pt notes mild pain.  Pt reports she is also very conscious as she never wants to fall again.      ROM:  AROM: RIGHT: Flexion 142 deg.  No pain        Strength:  Quad:  Right 5/5, no pain.      Right hip abduction 5/5.     Functional Biomechanical Screen  SLS:Able to hold for 30 \" on either side but with UE abducted slightly.      Palpation:  Tenderness to palpation: \"twinge\" medial TF joint line, MCL.       Optional Tests  Melo's:                 [x] Neg    [] Pos  Valgus:                        [x] Neg    [] Pos      - min Modality:      []  Ice     []  Heat       Position/location:   -   Rationale: decrease pain to improve the patients ability to do functional activities   [x] Skin assessment post-treatment:  [x]intact []redness- no adverse reaction    []redness  adverse reaction:      53 min Therapeutic Exercise:  [x] See flow sheet :  Re-assessment.  Review of final HEP.       Rationale: increase strength, improve coordination and increase proprioception to improve the patients ability to negotiating stairs     - min Manual Therapy:  -   Rationale: decrease pain, increase tissue extensibility and decrease trigger points  to improve the patients ability to -     With   [] TE   [] TA   [] neuro   [] other: Patient Education: [x] Review HEP    [] Progressed/Changed HEP based on:   [] positioning   [] body mechanics   [] transfers   [] heat/ice application    [] other:          Pain Level (0-10 scale) post treatment: 0     ASSESSMENT/Changes in Function:   Pt with 10 skilled PT visits at this time.  Pt notes decreased pain with sleeping and negotiating stairs.  She does still have pain at times with stairs but she notes it is less severe overall.  Pt notes pain at its worst is 1/10 (at evaluation was 10/10 at its worst).  Pt with improved strength and awareness of neutral LQ alignment with squats and stairs.  Pt with good understanding of HEP and ready for D/C to HEP.               Short Term Goals: To be accomplished in 2-3 weeks:              1) Pt independent in HEP from day 1 MET               2) Pt will report 25% reduction in pain while sleeping at [address] be accomplished in 4-6 weeks:              1) Pt independent in final HEP. MET              2) Pt will report she no longer is experiencing knee pain while sleeping or knee pain that disrupts her sleep.  MET              3) Pt able to negotiate 4 stairs in clinic without increased knee pain and with good eccentric control R LE MET                PLAN      [x]  D/C to HEP     []  Other:_       Dashawn Castaneda, PT   8/22/2019

## 2020-06-24 ENCOUNTER — HOSPITAL ENCOUNTER (OUTPATIENT)
Dept: LAB | Age: 75
Discharge: HOME | End: 2020-06-24
Payer: MEDICARE

## 2020-06-24 ENCOUNTER — OFFICE VISIT (OUTPATIENT)
Dept: INTERNAL MEDICINE CLINIC | Age: 75
End: 2020-06-24

## 2020-06-24 VITALS
OXYGEN SATURATION: 98 % | HEART RATE: 70 BPM | HEIGHT: 62 IN | SYSTOLIC BLOOD PRESSURE: 126 MMHG | DIASTOLIC BLOOD PRESSURE: 74 MMHG | TEMPERATURE: 98 F | RESPIRATION RATE: 14 BRPM | BODY MASS INDEX: 24.18 KG/M2 | WEIGHT: 131.4 LBS

## 2020-06-24 DIAGNOSIS — D05.10 DUCTAL CARCINOMA IN SITU (DCIS) OF BREAST, UNSPECIFIED LATERALITY: ICD-10-CM

## 2020-06-24 DIAGNOSIS — M85.80 OSTEOPENIA, UNSPECIFIED LOCATION: ICD-10-CM

## 2020-06-24 DIAGNOSIS — Z76.89 ENCOUNTER TO ESTABLISH CARE: Primary | ICD-10-CM

## 2020-06-24 DIAGNOSIS — Z90.79 S/P TAH-BSO: ICD-10-CM

## 2020-06-24 DIAGNOSIS — Z90.722 S/P TAH-BSO: ICD-10-CM

## 2020-06-24 DIAGNOSIS — M25.512 LEFT SHOULDER PAIN, UNSPECIFIED CHRONICITY: ICD-10-CM

## 2020-06-24 DIAGNOSIS — Z23 ENCOUNTER FOR IMMUNIZATION: ICD-10-CM

## 2020-06-24 DIAGNOSIS — Z90.710 S/P TAH-BSO: ICD-10-CM

## 2020-06-24 PROCEDURE — 81003 URINALYSIS AUTO W/O SCOPE: CPT

## 2020-06-24 PROCEDURE — 85025 COMPLETE CBC W/AUTO DIFF WBC: CPT

## 2020-06-24 PROCEDURE — 36415 COLL VENOUS BLD VENIPUNCTURE: CPT

## 2020-06-24 PROCEDURE — 80061 LIPID PANEL: CPT

## 2020-06-24 PROCEDURE — 80053 COMPREHEN METABOLIC PANEL: CPT

## 2020-06-24 RX ORDER — DICLOFENAC SODIUM 75 MG/1
75 TABLET, DELAYED RELEASE ORAL
Qty: 30 TAB | Refills: 0 | Status: SHIPPED | OUTPATIENT
Start: 2020-06-24

## 2020-06-24 NOTE — PROGRESS NOTES
New Patient Evaluation    Tika Butts is a 70 y.o. female. They are here to establish care with the group and me as a primary care provider. she has seen NP Yvette Fernandez in the past.  The last visit was July of 2019. She has a history of breast cancer over 10 years ago. She had biopsy and radiation. She is followed by  [facility] breast specialists    She has had a colonoscopy in 2019. Three polyps found and removed. She was told to return in 3-5 years. She has had no other issues. She has had a complete hysterectomy in the past due to uterine prolapse. Stable since then. Patient Active Problem List    Diagnosis Date Noted    Osteopenia 09/01/2016    Advanced care planning/counseling discussion 08/06/2016    Hx of colonic polyps 10/18/2013    Hx of lumpectomy 10/18/2013    DCIS (ductal carcinoma in situ) 03/04/2013    S/P DAMIR-BSO 03/04/2013    Sciatica 09/20/2011     Current Outpatient Medications   Medication Sig Dispense Refill    calcium carbonate (TUMS) 200 mg calcium (500 mg) Chew Take 1 Tab by mouth daily.  30 Tab 3     Allergies   Allergen Reactions    Penicillins Anaphylaxis     Past Medical History:   Diagnosis Date    Arthritis     spine    Ductal carcinoma in situ (DCIS) of right breast 2009    lumpectomy x2, XRT (followed by Dr. Roseann Mcnair)    History of colon polyps     Osteopenia 09/2016    mild, R fem neck -1.0     Past Surgical History:   Procedure Laterality Date    COLONOSCOPY N/A 7/11/2019    COL[facility]OSCOPY performed [facility] at [facility]COPY    HX BLADDER SUSPENSION      HX BREAST LUMPECTOMY      x2 (right)    HX COLONOSCOPY  2010    normal (but has had a h/o colon polyps), repeat q5 yrs, Dr. Mariely Benitez     Family History   Problem Relation Age of Onset    Cancer Mother         breast, bone    Cancer Father         kidney    No Known Problems Sister      Social History     Tobacco Use    Smoking status: Never Smoker    Smokeless tobacco: Never Used   Substance Use Topics    Alcohol use: No        Health Maintenance   Topic Date Due    Shingrix Vaccine Age 49> (1 of 2) 01/11/1997    GLAUCOMA SCREENING Q2Y  01/01/2017    Pneumococcal 65+ years (2 of 2 - PPSV23) 11/11/2017    Medicare Yearly Exam  02/14/2020    Breast Cancer Screen Mammogram  04/01/2020    Influenza Age 5 to Adult  08/01/2020    DTaP/Tdap/Td series (2 - Td) 10/18/2021    Lipid Screen  02/14/2024    Colonoscopy  07/11/2024    Hepatitis C Screening  Completed    Bone Densitometry (Dexa) Screening  Completed       Review of Systems   Constitutional: Negative. Respiratory: Negative. Cardiovascular: Negative. Gastrointestinal: Negative. Visit Vitals  /74 (BP 1 Location: Left arm, BP Patient Position: Sitting)   Pulse 70   Temp 98 °F (36.7 °C) (Temporal)   Resp 14   Ht 5' 2.36\" (1.584 m)   Wt 131 lb 6.4 oz (59.6 kg)   SpO2 98%   BMI 23.76 kg/m²       Physical Exam  Constitutional:       Appearance: Normal appearance. Cardiovascular:      Rate and Rhythm: Normal rate and regular rhythm. Pulses: Normal pulses. Heart sounds: Normal heart sounds. Pulmonary:      Effort: Pulmonary effort is normal.      Breath sounds: Normal breath sounds. Neurological:      Mental Status: She is alert. ASSESSMENT/PLAN    Diagnoses and all orders for this visit:    1. Encounter to establish care  -     LIPID PANEL  -     CBC WITH AUTOMATED DIFF  -     METABOLIC PANEL, COMPREHENSIVE  -     URINALYSIS W/ RFLX MICROSCOPIC    2. Left shoulder pain, unspecified chronicity  -     diclofenac EC (VOLTAREN) 75 mg EC tablet; Take 1 Tab by mouth two (2) times daily as needed for Pain. 3. S/P DAMIR-BSO    4. Ductal carcinoma in situ (DCIS) of breast, unspecified laterality    5. Osteopenia, unspecified location  -     DEXA BONE DENSITY STUDY AXIAL; Future    6. Encounter for immunization            -Discussed with the patient to continue the current plan of care.   We will obtain baseline labwork and determine if any adjustments need to be done. We will also await the records of the previous PCP to ascertain further details of the patient's history. The patient agrees with and understands the plan of care. All questions have been answered.

## 2020-06-25 LAB
ALBUMIN SERPL-MCNC: 4.4 G/DL (ref 3.7–4.7)
ALBUMIN SERPL-MCNC: NORMAL G/DL
ALBUMIN/GLOB SERPL: 1.6 {RATIO} (ref 1.2–2.2)
ALP SERPL-CCNC: 64 IU/L (ref 39–117)
ALP SERPL-CCNC: NORMAL U/L
ALT SERPL-CCNC: 27 IU/L (ref 0–32)
ALT SERPL-CCNC: NORMAL U/L
APPEARANCE UR: CLEAR
AST SERPL-CCNC: 27 IU/L (ref 0–40)
AST SERPL-CCNC: NORMAL U/L
BASOPHILS # BLD AUTO: 0 X10E3/UL (ref 0–0.2)
BASOPHILS # BLD AUTO: NORMAL 10*3/UL
BASOPHILS NFR BLD AUTO: 1 %
BILIRUB SERPL-MCNC: 1.1 MG/DL (ref 0–1.2)
BILIRUB SERPL-MCNC: NORMAL MG/DL
BILIRUB UR QL STRIP: NEGATIVE
BUN SERPL-MCNC: 15 MG/DL (ref 8–27)
BUN SERPL-MCNC: NORMAL MG/DL
BUN/CREAT SERPL: 18 (ref 12–28)
CALCIUM SERPL-MCNC: 9.7 MG/DL (ref 8.7–10.3)
CALCIUM SERPL-MCNC: NORMAL MG/DL
CHLORIDE SERPL-SCNC: 101 MMOL/L (ref 96–106)
CHLORIDE SERPL-SCNC: NORMAL MMOL/L
CHOLEST SERPL-MCNC: 221 MG/DL (ref 100–199)
CHOLEST SERPL-MCNC: NORMAL MG/DL
CO2 SERPL-SCNC: 23 MMOL/L (ref 20–29)
CO2 SERPL-SCNC: NORMAL MMOL/L
COLOR UR: YELLOW
CREAT SERPL-MCNC: 0.85 MG/DL (ref 0.57–1)
CREAT SERPL-MCNC: NORMAL MG/DL
EOSINOPHIL # BLD AUTO: 0.2 X10E3/UL (ref 0–0.4)
EOSINOPHIL # BLD AUTO: NORMAL 10*3/UL
EOSINOPHIL NFR BLD AUTO: 5 %
EOSINOPHIL NFR BLD AUTO: NORMAL %
ERYTHROCYTE [DISTWIDTH] IN BLOOD BY AUTOMATED COUNT: 13.7 % (ref 11.7–15.4)
GLOBULIN SER CALC-MCNC: 2.7 G/DL (ref 1.5–4.5)
GLUCOSE SERPL-MCNC: 92 MG/DL (ref 65–99)
GLUCOSE SERPL-MCNC: NORMAL MG/DL
GLUCOSE UR QL: NEGATIVE
HCT VFR BLD AUTO: 44.4 % (ref 34–46.6)
HCT VFR BLD AUTO: NORMAL %
HDLC SERPL-MCNC: 67 MG/DL
HDLC SERPL-MCNC: NORMAL MG/DL
HGB BLD-MCNC: 14.9 G/DL (ref 11.1–15.9)
HGB BLD-MCNC: NORMAL G/DL
HGB UR QL STRIP: NEGATIVE
IMM GRANULOCYTES # BLD AUTO: 0 X10E3/UL (ref 0–0.1)
IMM GRANULOCYTES NFR BLD AUTO: 0 %
KETONES UR QL STRIP: NEGATIVE
LDLC SERPL CALC-MCNC: 128 MG/DL (ref 0–99)
LEUKOCYTE ESTERASE UR QL STRIP: NEGATIVE
LYMPHOCYTES # BLD AUTO: 1.2 X10E3/UL (ref 0.7–3.1)
LYMPHOCYTES # BLD AUTO: NORMAL 10*3/UL
LYMPHOCYTES NFR BLD AUTO: 25 %
LYMPHOCYTES NFR BLD AUTO: NORMAL %
MCH RBC QN AUTO: 28 PG (ref 26.6–33)
MCHC RBC AUTO-ENTMCNC: 33.6 G/DL (ref 31.5–35.7)
MCV RBC AUTO: 84 FL (ref 79–97)
MICRO URNS: NORMAL
MONOCYTES # BLD AUTO: 0.4 X10E3/UL (ref 0.1–0.9)
MONOCYTES NFR BLD AUTO: 9 %
MONOCYTES NFR BLD AUTO: NORMAL %
NEUTROPHILS # BLD AUTO: 2.8 X10E3/UL (ref 1.4–7)
NEUTROPHILS NFR BLD AUTO: 60 %
NEUTROPHILS NFR BLD AUTO: NORMAL %
NITRITE UR QL STRIP: NEGATIVE
PH UR STRIP: 5 [PH] (ref 5–7.5)
PLATELET # BLD AUTO: 220 X10E3/UL (ref 150–450)
PLATELET # BLD AUTO: NORMAL 10*3/UL
POTASSIUM SERPL-SCNC: 4.8 MMOL/L (ref 3.5–5.2)
POTASSIUM SERPL-SCNC: NORMAL MMOL/L
PROT SERPL-MCNC: 7.1 G/DL (ref 6–8.5)
PROT SERPL-MCNC: NORMAL G/DL
PROT UR QL STRIP: NEGATIVE
RBC # BLD AUTO: 5.32 X10E6/UL (ref 3.77–5.28)
RBC # BLD AUTO: NORMAL 10*6/UL
SODIUM SERPL-SCNC: 140 MMOL/L (ref 134–144)
SODIUM SERPL-SCNC: NORMAL MMOL/L
SP GR UR: 1.02 (ref 1–1.03)
TRIGL SERPL-MCNC: 128 MG/DL (ref 0–149)
TRIGL SERPL-MCNC: NORMAL MG/DL (ref ?–150)
UROBILINOGEN UR STRIP-MCNC: 0.2 MG/DL (ref 0.2–1)
VLDLC SERPL CALC-MCNC: 26 MG/DL (ref 5–40)
WBC # BLD AUTO: 4.7 X10E3/UL (ref 3.4–10.8)
WBC # BLD AUTO: NORMAL X10E3/UL

## 2020-07-07 ENCOUNTER — TELEPHONE (OUTPATIENT)
Dept: INTERNAL MEDICINE CLINIC | Age: 75
End: 2020-07-07

## 2020-07-07 DIAGNOSIS — Z78.0 POSTMENOPAUSAL STATUS: Primary | ICD-10-CM

## 2020-07-07 NOTE — TELEPHONE ENCOUNTER
Carissa with scheduling called, she states this patient needs a new bone density order with a corrected diagnosis code. The current code produced a ABN tiger for Medicare. The patient is scheduled for this Friday.

## 2020-07-10 ENCOUNTER — HOSPITAL ENCOUNTER (OUTPATIENT)
Dept: MAMMOGRAPHY | Age: 75
Discharge: HOME | End: 2020-07-10
Attending: INTERNAL MEDICINE
Payer: MEDICARE

## 2020-07-10 DIAGNOSIS — Z78.0 POSTMENOPAUSAL STATUS: ICD-10-CM

## 2020-07-10 PROCEDURE — 77080 DXA BONE DENSITY AXIAL: CPT

## 2020-12-24 ENCOUNTER — TELEPHONE (OUTPATIENT)
Dept: INTERNAL MEDICINE CLINIC | Age: 75
End: 2020-12-24

## 2020-12-24 DIAGNOSIS — M85.80 OSTEOPENIA, UNSPECIFIED LOCATION: ICD-10-CM

## 2020-12-24 DIAGNOSIS — Z00.00 MEDICARE ANNUAL WELLNESS VISIT, SUBSEQUENT: Primary | ICD-10-CM

## 2020-12-24 DIAGNOSIS — D05.10 DUCTAL CARCINOMA IN SITU (DCIS) OF BREAST, UNSPECIFIED LATERALITY: ICD-10-CM

## 2020-12-24 NOTE — TELEPHONE ENCOUNTER
Bhakti Oneal ([facility]) [phone] (H)     Pt has booked a fu appt with dr Emma Lancaster for feb 9 and wants to know if she will need to have labs done before this appt?

## 2021-02-05 ENCOUNTER — APPOINTMENT (OUTPATIENT)
Dept: INTERNAL MEDICINE CLINIC | Age: 76
End: 2021-02-05

## 2021-02-05 DIAGNOSIS — M85.80 OSTEOPENIA, UNSPECIFIED LOCATION: ICD-10-CM

## 2021-02-05 DIAGNOSIS — D05.10 DUCTAL CARCINOMA IN SITU (DCIS) OF BREAST, UNSPECIFIED LATERALITY: ICD-10-CM

## 2021-02-05 DIAGNOSIS — Z00.00 MEDICARE ANNUAL WELLNESS VISIT, SUBSEQUENT: ICD-10-CM

## 2021-02-05 LAB
ALBUMIN SERPL-MCNC: 3.8 G/DL (ref 3.5–5)
ALBUMIN/GLOB SERPL: 1.4 {RATIO} (ref 1.1–2.2)
ALP SERPL-CCNC: 64 U/L (ref 45–117)
ALT SERPL-CCNC: 26 U/L (ref 12–78)
ANION GAP SERPL CALC-SCNC: 6 MMOL/L (ref 5–15)
AST SERPL-CCNC: 24 U/L (ref 15–37)
BILIRUB SERPL-MCNC: 1 MG/DL (ref 0.2–1)
BUN SERPL-MCNC: 19 MG/DL (ref 6–20)
BUN/CREAT SERPL: 21 (ref 12–20)
CALCIUM SERPL-MCNC: 9.5 MG/DL (ref 8.5–10.1)
CHLORIDE SERPL-SCNC: 107 MMOL/L (ref 97–108)
CHOLEST SERPL-MCNC: 197 MG/DL
CO2 SERPL-SCNC: 29 MMOL/L (ref 21–32)
CREAT SERPL-MCNC: 0.89 MG/DL (ref 0.55–1.02)
GLOBULIN SER CALC-MCNC: 2.8 G/DL (ref 2–4)
GLUCOSE SERPL-MCNC: 91 MG/DL (ref 65–100)
HDLC SERPL-MCNC: 66 MG/DL
HDLC SERPL: 3 {RATIO} (ref 0–5)
LDLC SERPL CALC-MCNC: 106.2 MG/DL (ref 0–100)
LIPID PROFILE,FLP: ABNORMAL
POTASSIUM SERPL-SCNC: 4.2 MMOL/L (ref 3.5–5.1)
PROT SERPL-MCNC: 6.6 G/DL (ref 6.4–8.2)
SODIUM SERPL-SCNC: 142 MMOL/L (ref 136–145)
TRIGL SERPL-MCNC: 124 MG/DL (ref ?–150)
VLDLC SERPL CALC-MCNC: 24.8 MG/DL

## 2021-02-09 ENCOUNTER — OFFICE VISIT (OUTPATIENT)
Dept: INTERNAL MEDICINE CLINIC | Age: 76
End: 2021-02-09
Payer: MEDICARE

## 2021-02-09 VITALS
TEMPERATURE: 97.5 F | DIASTOLIC BLOOD PRESSURE: 60 MMHG | OXYGEN SATURATION: 99 % | WEIGHT: 135.6 LBS | SYSTOLIC BLOOD PRESSURE: 110 MMHG | HEART RATE: 72 BPM | BODY MASS INDEX: 24.95 KG/M2 | RESPIRATION RATE: 14 BRPM | HEIGHT: 62 IN

## 2021-02-09 DIAGNOSIS — E78.5 ELEVATED LIPIDS: Primary | ICD-10-CM

## 2021-02-09 DIAGNOSIS — Z78.0 POSTMENOPAUSAL STATUS: ICD-10-CM

## 2021-02-09 PROCEDURE — G8536 NO DOC ELDER MAL SCRN: HCPCS | Performed by: INTERNAL MEDICINE

## 2021-02-09 PROCEDURE — 3017F COLORECTAL CA SCREEN DOC REV: CPT | Performed by: INTERNAL MEDICINE

## 2021-02-09 PROCEDURE — G0463 HOSPITAL OUTPT CLINIC VISIT: HCPCS | Performed by: INTERNAL MEDICINE

## 2021-02-09 PROCEDURE — G9899 SCRN MAM PERF RSLTS DOC: HCPCS | Performed by: INTERNAL MEDICINE

## 2021-02-09 PROCEDURE — G8420 CALC BMI NORM PARAMETERS: HCPCS | Performed by: INTERNAL MEDICINE

## 2021-02-09 PROCEDURE — G8427 DOCREV CUR MEDS BY ELIG CLIN: HCPCS | Performed by: INTERNAL MEDICINE

## 2021-02-09 PROCEDURE — G8510 SCR DEP NEG, NO PLAN REQD: HCPCS | Performed by: INTERNAL MEDICINE

## 2021-02-09 PROCEDURE — 1101F PT FALLS ASSESS-DOCD LE1/YR: CPT | Performed by: INTERNAL MEDICINE

## 2021-02-09 PROCEDURE — 1090F PRES/ABSN URINE INCON ASSESS: CPT | Performed by: INTERNAL MEDICINE

## 2021-02-09 PROCEDURE — 99214 OFFICE O/P EST MOD 30 MIN: CPT | Performed by: INTERNAL MEDICINE

## 2021-02-09 PROCEDURE — G8399 PT W/DXA RESULTS DOCUMENT: HCPCS | Performed by: INTERNAL MEDICINE

## 2021-02-09 RX ORDER — BISMUTH SUBSALICYLATE 262 MG
1 TABLET,CHEWABLE ORAL DAILY
COMMUNITY

## 2021-02-09 NOTE — PROGRESS NOTES
Follow Up Visit    Connor Rizo is a 78 y.o. female. she presents for Follow-up    The patient reports feeling and doing well. She has recently obtained labs showing an improvement in her lipid panel (LDL). She has been walking 3 miles daily and had been eating healthily. She had a bone density scan in July that was normal.        Patient Active Problem List   Diagnosis Code    Sciatica M54.30    DCIS (ductal carcinoma in situ) D05.10    S/P DAMIR-BSO Z90.710, Z90.722, Z90.79    Hx of colonic polyps Z86.010    Hx of lumpectomy Z98.890    Advanced care planning/counseling discussion Z71.89    Osteopenia M85.80         Prior to Admission medications    Medication Sig Start Date End Date Taking? Authorizing Provider   multivitamin (ONE A DAY) tablet Take 1 Tab by mouth daily. Yes Provider, Historical   calcium carbonate (TUMS) 200 mg calcium (500 mg) Chew Take 1 Tab by mouth daily. 10/18/13  Yes Boogie Kruse MD   diclofenac EC (VOLTAREN) 75 mg EC tablet Take 1 Tab by mouth two (2) times daily as needed for Pain. 6/24/20   Daily Alvarado MD         Health Maintenance   Topic Date Due    COVID-19 Vaccine (1 of 2) 01/11/1963    Shingrix Vaccine Age 50> (1 of 2) 01/11/1997    GLAUCOMA SCREENING Q2Y  01/01/2017    Pneumococcal 65+ years (2 of 2 - PPSV23) 11/11/2017    Breast Cancer Screen Mammogram  06/25/2021    DTaP/Tdap/Td series (2 - Td) 10/18/2021    Colorectal Cancer Screening Combo  07/11/2024    Lipid Screen  02/05/2026    Hepatitis C Screening  Completed    Bone Densitometry (Dexa) Screening  Completed    Flu Vaccine  Completed       Review of Systems   Constitutional: Negative. Respiratory: Negative. Cardiovascular: Negative. Genitourinary: Negative.             Visit Vitals  /60 (BP 1 Location: Left lower arm, BP Patient Position: Sitting, BP Cuff Size: Small adult)   Pulse 72   Temp 97.5 °F (36.4 °C) (Temporal)   Resp 14   Ht 5' 2.36\" (1.584 m)   Wt 135 lb 9.6 oz (61.5 kg)   SpO2 99%   BMI 24.52 kg/m²       Physical Exam  Constitutional:       Appearance: Normal appearance. Cardiovascular:      Rate and Rhythm: Normal rate and regular rhythm. Pulses: Normal pulses. Heart sounds: Normal heart sounds. Pulmonary:      Effort: Pulmonary effort is normal.      Breath sounds: Normal breath sounds. Neurological:      Mental Status: She is alert. ASSESSMENT/PLAN    Diagnoses and all orders for this visit:    1. Elevated lipids - These have resolved at this time. For now, we will follow up in 6 months with her medicare wellness visit. 2. Postmenopausal status        Follow-up and Dispositions    · Return in about 6 months (around 8/9/2021) for Medicare Wellness Visit- 30 minute appointment.

## 2021-02-09 NOTE — PROGRESS NOTES
Chief Complaint   Patient presents with    Follow-up     Reviewed record in preparation for visit and have obtained necessary documentation. Identified pt with two pt identifiers(name and ). Health Maintenance Due   Topic    COVID-19 Vaccine (1 of 2)    Shingrix Vaccine Age 50> (1 of 2)    GLAUCOMA SCREENING Q2Y     Pneumococcal 65+ years (2 of 2 - PPSV23)         Chief Complaint   Patient presents with    Follow-up        Wt Readings from Last 3 Encounters:   21 135 lb 9.6 oz (61.5 kg)   20 131 lb 6.4 oz (59.6 kg)   07/15/19 137 lb (62.1 kg)     Temp Readings from Last 3 Encounters:   21 97.5 °F (36.4 °C) (Temporal)   20 98 °F (36.7 °C) (Temporal)   07/15/19 97.8 °F (36.6 °C) (Oral)     BP Readings from Last 3 Encounters:   21 110/60   20 126/74   07/15/19 122/60     Pulse Readings from Last 3 Encounters:   21 72   20 70   07/15/19 87           Learning Assessment:  :     Learning Assessment 7/15/2019 2018 2017 3/6/2014   PRIMARY LEARNER Patient Patient Patient Patient   HIGHEST LEVEL OF EDUCATION - PRIMARY LEARNER  > 4 YEARS OF COLLEGE 4 YEARS OF COLLEGE 4 YEARS OF COLLEGE 4 YEARS OF COLLEGE   BARRIERS PRIMARY LEARNER NONE - NONE NONE   CO-LEARNER CAREGIVER No No No No   PRIMARY LANGUAGE ENGLISH ENGLISH ENGLISH ENGLISH    NEED - - - No   LEARNER PREFERENCE PRIMARY READING DEMONSTRATION DEMONSTRATION LISTENING     - - LISTENING READING     - - READING DEMONSTRATION   LEARNING SPECIAL TOPICS - - - no   ANSWERED BY patient  patient patient patient   RELATIONSHIP SELF SELF SELF SELF   ASSESSMENT COMMENT - - - none       Depression Screening:  :     3 most recent PHQ Screens 2021   Little interest or pleasure in doing things Not at all   Feeling down, depressed, irritable, or hopeless Not at all   Total Score PHQ 2 0       Fall Risk Assessment:  :     Fall Risk Assessment, last 12 mths 2021   Able to walk?  Yes   Fall in past 15 months? 0   Do you feel unsteady? 0   Are you worried about falling 0   Number of falls in past 12 months -   Fall with injury? -       Abuse Screening:  :     Abuse Screening Questionnaire 7/15/2019 8/8/2018 8/18/2017 6/22/2017 3/6/2014   Do you ever feel afraid of your partner? N N N N N   Are you in a relationship with someone who physically or mentally threatens you? N N N N N   Is it safe for you to go home? Y Y Y Y Y       Coordination of Care Questionnaire:  :     1) Have you been to an emergency room, urgent care clinic since your last visit? no   Hospitalized since your last visit? no             2) Have you seen or consulted any other health care providers outside of [address] [place] [address] since your last visit? no  (Include any pap smears or colon screenings in this section.)    3) Do you have an Advance Directive on file? no    4) Are you interested in receiving information on Advance Directives? NO      Patient is accompanied by self I have received verbal consent from Matthew Shaver to discuss any/all medical information while they are present in the room. Reviewed record  In preparation for visit and have obtained necessary documentation.

## 2023-05-19 RX ORDER — UREA 10 %
200 LOTION (ML) TOPICAL DAILY
COMMUNITY
Start: 2013-10-18

## 2023-05-19 RX ORDER — DICLOFENAC SODIUM 75 MG/1
75 TABLET, DELAYED RELEASE ORAL 2 TIMES DAILY PRN
COMMUNITY
Start: 2020-06-24

## 2024-03-11 SDOH — HEALTH STABILITY: PHYSICAL HEALTH: ON AVERAGE, HOW MANY DAYS PER WEEK DO YOU ENGAGE IN MODERATE TO STRENUOUS EXERCISE (LIKE A BRISK WALK)?: 6 DAYS

## 2024-03-11 SDOH — HEALTH STABILITY: PHYSICAL HEALTH: ON AVERAGE, HOW MANY MINUTES DO YOU ENGAGE IN EXERCISE AT THIS LEVEL?: 60 MIN

## 2024-03-13 ENCOUNTER — OFFICE VISIT (OUTPATIENT)
Age: 79
End: 2024-03-13
Payer: MEDICARE

## 2024-03-13 VITALS
OXYGEN SATURATION: 99 % | RESPIRATION RATE: 16 BRPM | HEART RATE: 82 BPM | BODY MASS INDEX: 23.07 KG/M2 | WEIGHT: 130.2 LBS | HEIGHT: 63 IN | SYSTOLIC BLOOD PRESSURE: 118 MMHG | DIASTOLIC BLOOD PRESSURE: 70 MMHG

## 2024-03-13 DIAGNOSIS — Z98.890 HISTORY OF BLADDER SURGERY: ICD-10-CM

## 2024-03-13 DIAGNOSIS — K63.5 POLYP OF COLON, UNSPECIFIED PART OF COLON, UNSPECIFIED TYPE: ICD-10-CM

## 2024-03-13 DIAGNOSIS — D05.11 DUCTAL CARCINOMA IN SITU (DCIS) OF RIGHT BREAST: ICD-10-CM

## 2024-03-13 DIAGNOSIS — Z00.00 MEDICARE ANNUAL WELLNESS VISIT, SUBSEQUENT: ICD-10-CM

## 2024-03-13 DIAGNOSIS — Z98.890 S/P LUMPECTOMY, RIGHT BREAST: ICD-10-CM

## 2024-03-13 DIAGNOSIS — H35.30 MACULAR DEGENERATION OF BOTH EYES, UNSPECIFIED TYPE: ICD-10-CM

## 2024-03-13 DIAGNOSIS — Z90.710 S/P HYSTERECTOMY: ICD-10-CM

## 2024-03-13 DIAGNOSIS — E78.00 HYPERCHOLESTEROLEMIA: ICD-10-CM

## 2024-03-13 DIAGNOSIS — E78.00 HYPERCHOLESTEROLEMIA: Primary | ICD-10-CM

## 2024-03-13 DIAGNOSIS — E55.9 VITAMIN D DEFICIENCY: ICD-10-CM

## 2024-03-13 LAB
BASOPHILS # BLD AUTO: 0 X10E3/UL (ref 0–0.2)
BASOPHILS NFR BLD AUTO: 1 %
EOSINOPHIL # BLD AUTO: 0.1 X10E3/UL (ref 0–0.4)
EOSINOPHIL NFR BLD AUTO: 3 %
ERYTHROCYTE [DISTWIDTH] IN BLOOD BY AUTOMATED COUNT: 13.3 % (ref 11.7–15.4)
HCT VFR BLD AUTO: 44.5 % (ref 34–46.6)
HGB BLD-MCNC: 14.3 G/DL (ref 11.1–15.9)
IMM GRANULOCYTES # BLD AUTO: 0 X10E3/UL (ref 0–0.1)
IMM GRANULOCYTES NFR BLD AUTO: 0 %
LYMPHOCYTES # BLD AUTO: 1.3 X10E3/UL (ref 0.7–3.1)
LYMPHOCYTES NFR BLD AUTO: 27 %
MCH RBC QN AUTO: 28.1 PG (ref 26.6–33)
MCHC RBC AUTO-ENTMCNC: 32.1 G/DL (ref 31.5–35.7)
MCV RBC AUTO: 88 FL (ref 79–97)
MONOCYTES # BLD AUTO: 0.4 X10E3/UL (ref 0.1–0.9)
MONOCYTES NFR BLD AUTO: 9 %
NEUTROPHILS # BLD AUTO: 2.8 X10E3/UL (ref 1.4–7)
NEUTROPHILS NFR BLD AUTO: 60 %
PLATELET # BLD AUTO: 217 X10E3/UL (ref 150–450)
RBC # BLD AUTO: 5.08 X10E6/UL (ref 3.77–5.28)
WBC # BLD AUTO: 4.7 X10E3/UL (ref 3.4–10.8)

## 2024-03-13 PROCEDURE — G8484 FLU IMMUNIZE NO ADMIN: HCPCS | Performed by: INTERNAL MEDICINE

## 2024-03-13 PROCEDURE — 99204 OFFICE O/P NEW MOD 45 MIN: CPT | Performed by: INTERNAL MEDICINE

## 2024-03-13 PROCEDURE — 1123F ACP DISCUSS/DSCN MKR DOCD: CPT | Performed by: INTERNAL MEDICINE

## 2024-03-13 PROCEDURE — G8399 PT W/DXA RESULTS DOCUMENT: HCPCS | Performed by: INTERNAL MEDICINE

## 2024-03-13 PROCEDURE — G8420 CALC BMI NORM PARAMETERS: HCPCS | Performed by: INTERNAL MEDICINE

## 2024-03-13 PROCEDURE — 4004F PT TOBACCO SCREEN RCVD TLK: CPT | Performed by: INTERNAL MEDICINE

## 2024-03-13 PROCEDURE — G8427 DOCREV CUR MEDS BY ELIG CLIN: HCPCS | Performed by: INTERNAL MEDICINE

## 2024-03-13 PROCEDURE — 1090F PRES/ABSN URINE INCON ASSESS: CPT | Performed by: INTERNAL MEDICINE

## 2024-03-13 PROCEDURE — G0439 PPPS, SUBSEQ VISIT: HCPCS | Performed by: INTERNAL MEDICINE

## 2024-03-13 SDOH — ECONOMIC STABILITY: INCOME INSECURITY: HOW HARD IS IT FOR YOU TO PAY FOR THE VERY BASICS LIKE FOOD, HOUSING, MEDICAL CARE, AND HEATING?: NOT HARD AT ALL

## 2024-03-13 SDOH — ECONOMIC STABILITY: FOOD INSECURITY: WITHIN THE PAST 12 MONTHS, YOU WORRIED THAT YOUR FOOD WOULD RUN OUT BEFORE YOU GOT MONEY TO BUY MORE.: NEVER TRUE

## 2024-03-13 SDOH — ECONOMIC STABILITY: HOUSING INSECURITY
IN THE LAST 12 MONTHS, WAS THERE A TIME WHEN YOU DID NOT HAVE A STEADY PLACE TO SLEEP OR SLEPT IN A SHELTER (INCLUDING NOW)?: NO

## 2024-03-13 SDOH — ECONOMIC STABILITY: FOOD INSECURITY: WITHIN THE PAST 12 MONTHS, THE FOOD YOU BOUGHT JUST DIDN'T LAST AND YOU DIDN'T HAVE MONEY TO GET MORE.: NEVER TRUE

## 2024-03-13 ASSESSMENT — ANXIETY QUESTIONNAIRES
3. WORRYING TOO MUCH ABOUT DIFFERENT THINGS: 0
GAD7 TOTAL SCORE: 0
7. FEELING AFRAID AS IF SOMETHING AWFUL MIGHT HAPPEN: 0
2. NOT BEING ABLE TO STOP OR CONTROL WORRYING: 0
1. FEELING NERVOUS, ANXIOUS, OR ON EDGE: 0
4. TROUBLE RELAXING: 0
6. BECOMING EASILY ANNOYED OR IRRITABLE: 0
5. BEING SO RESTLESS THAT IT IS HARD TO SIT STILL: 0
IF YOU CHECKED OFF ANY PROBLEMS ON THIS QUESTIONNAIRE, HOW DIFFICULT HAVE THESE PROBLEMS MADE IT FOR YOU TO DO YOUR WORK, TAKE CARE OF THINGS AT HOME, OR GET ALONG WITH OTHER PEOPLE: NOT DIFFICULT AT ALL

## 2024-03-13 ASSESSMENT — PATIENT HEALTH QUESTIONNAIRE - PHQ9
SUM OF ALL RESPONSES TO PHQ QUESTIONS 1-9: 0
2. FEELING DOWN, DEPRESSED OR HOPELESS: 0
DEPRESSION UNABLE TO ASSESS: PT REFUSES
SUM OF ALL RESPONSES TO PHQ9 QUESTIONS 1 & 2: 0
1. LITTLE INTEREST OR PLEASURE IN DOING THINGS: 0

## 2024-03-13 ASSESSMENT — ENCOUNTER SYMPTOMS
GASTROINTESTINAL NEGATIVE: 1
ABDOMINAL PAIN: 0
ALLERGIC/IMMUNOLOGIC NEGATIVE: 1
EYES NEGATIVE: 1
RESPIRATORY NEGATIVE: 1
SHORTNESS OF BREATH: 0

## 2024-03-13 NOTE — PROGRESS NOTES
Chief Complaint   Patient presents with    New Patient    Establish Care    Bleeding/Bruising     Only on arms        \"Have you been to the ER, urgent care clinic since your last visit?  Hospitalized since your last visit?\"    NO    “Have you seen or consulted any other health care providers outside of [facility] since your last visit?”    NO          
      Allergies   Allergen Reactions    Penicillins Anaphylaxis     Prior to Visit Medications    Medication Sig Taking? Authorizing Provider   calcium carbonate (OS-NASIR) 1250 (500 Ca) MG chewable tablet Take 200 mg by mouth daily Yes Automatic Reconciliation, Ar       CareTeam (Including outside providers/suppliers regularly involved in providing care):   Patient Care Team:  Rashaun Rodríguez DO as PCP - General (Internal Medicine)     Reviewed and updated this visit:  Allergies  Meds  Problems  Med Hx  Surg Hx  Soc Hx  Fam Hx           
  Neurological:      General: No focal deficit present.      Mental Status: She is alert and oriented to person, place, and time.      Gait: Gait normal.   Psychiatric:         Mood and Affect: Mood normal.         Behavior: Behavior normal.            Assessment & Plan   Diagnosis Orders   1. Hypercholesterolemia  Lipid Panel    Comprehensive Metabolic Panel    CBC with Auto Differential      2. Ductal carcinoma in situ (DCIS) of right breast  Stable  Yearly mammograms      3. History of bladder surgery        4. S/P lumpectomy, right breast        5. S/P hysterectomy        6. Polyp of colon, unspecified part of colon, unspecified type  Followed by GI  No new GI issues  His colonoscopy was about 5 years      7. Macular degeneration of both eyes, unspecified type  Stable.  Followed by ophthalmologist.  Continue Ocuvite.      8. Vitamin D deficiency  Vitamin D 25 Hydroxy      9. Medicare annual wellness visit, subsequent     10.   Easy bruising                            reassurance.  Check CBC.       Orders Placed This Encounter    Lipid Panel     Standing Status:   Future     Standing Expiration Date:   3/13/2025    Comprehensive Metabolic Panel     Standing Status:   Future     Standing Expiration Date:   3/13/2025    CBC with Auto Differential     Standing Status:   Future     Standing Expiration Date:   3/13/2025    Vitamin D 25 Hydroxy     Standing Status:   Future     Standing Expiration Date:   3/13/2025        Return in about 1 year (around 3/13/2025).     An After Visit Summary was printed and given to the patient.      Rashaun Rodríguez DO

## 2024-03-14 LAB
25(OH)D3+25(OH)D2 SERPL-MCNC: 33.1 NG/ML (ref 30–100)
ALBUMIN SERPL-MCNC: 4.4 G/DL (ref 3.8–4.8)
ALBUMIN/GLOB SERPL: 2.3 {RATIO} (ref 1.2–2.2)
ALP SERPL-CCNC: 62 IU/L (ref 44–121)
ALT SERPL-CCNC: 15 IU/L (ref 0–32)
AST SERPL-CCNC: 23 IU/L (ref 0–40)
BILIRUB SERPL-MCNC: 1 MG/DL (ref 0–1.2)
BUN SERPL-MCNC: 18 MG/DL (ref 8–27)
BUN/CREAT SERPL: 19 (ref 12–28)
CALCIUM SERPL-MCNC: 9.7 MG/DL (ref 8.7–10.3)
CHLORIDE SERPL-SCNC: 104 MMOL/L (ref 96–106)
CHOLEST SERPL-MCNC: 195 MG/DL (ref 100–199)
CO2 SERPL-SCNC: 25 MMOL/L (ref 20–29)
CREAT SERPL-MCNC: 0.96 MG/DL (ref 0.57–1)
EGFRCR SERPLBLD CKD-EPI 2021: 61 ML/MIN/1.73
GLOBULIN SER CALC-MCNC: 1.9 G/DL (ref 1.5–4.5)
GLUCOSE SERPL-MCNC: 88 MG/DL (ref 70–99)
HDLC SERPL-MCNC: 64 MG/DL
IMP & REVIEW OF LAB RESULTS: NORMAL
LDLC SERPL CALC-MCNC: 111 MG/DL (ref 0–99)
POTASSIUM SERPL-SCNC: 4.5 MMOL/L (ref 3.5–5.2)
PROT SERPL-MCNC: 6.3 G/DL (ref 6–8.5)
SODIUM SERPL-SCNC: 142 MMOL/L (ref 134–144)
TRIGL SERPL-MCNC: 112 MG/DL (ref 0–149)
VLDLC SERPL CALC-MCNC: 20 MG/DL (ref 5–40)

## 2024-09-10 ENCOUNTER — TELEPHONE (OUTPATIENT)
Age: 79
End: 2024-09-10

## 2024-09-10 DIAGNOSIS — M54.30 SCIATICA, UNSPECIFIED LATERALITY: Primary | ICD-10-CM

## 2024-09-23 ENCOUNTER — OFFICE VISIT (OUTPATIENT)
Age: 79
End: 2024-09-23
Payer: MEDICARE

## 2024-09-23 VITALS
WEIGHT: 126.8 LBS | HEIGHT: 63 IN | OXYGEN SATURATION: 96 % | SYSTOLIC BLOOD PRESSURE: 124 MMHG | DIASTOLIC BLOOD PRESSURE: 72 MMHG | TEMPERATURE: 97.9 F | HEART RATE: 64 BPM | BODY MASS INDEX: 22.47 KG/M2

## 2024-09-23 DIAGNOSIS — M54.32 SCIATICA, LEFT SIDE: Primary | ICD-10-CM

## 2024-09-23 PROCEDURE — G8399 PT W/DXA RESULTS DOCUMENT: HCPCS | Performed by: CLINICAL NURSE SPECIALIST

## 2024-09-23 PROCEDURE — G8427 DOCREV CUR MEDS BY ELIG CLIN: HCPCS | Performed by: CLINICAL NURSE SPECIALIST

## 2024-09-23 PROCEDURE — 99213 OFFICE O/P EST LOW 20 MIN: CPT | Performed by: CLINICAL NURSE SPECIALIST

## 2024-09-23 PROCEDURE — 4004F PT TOBACCO SCREEN RCVD TLK: CPT | Performed by: CLINICAL NURSE SPECIALIST

## 2024-09-23 PROCEDURE — 1123F ACP DISCUSS/DSCN MKR DOCD: CPT | Performed by: CLINICAL NURSE SPECIALIST

## 2024-09-23 PROCEDURE — G8420 CALC BMI NORM PARAMETERS: HCPCS | Performed by: CLINICAL NURSE SPECIALIST

## 2024-09-23 PROCEDURE — 1090F PRES/ABSN URINE INCON ASSESS: CPT | Performed by: CLINICAL NURSE SPECIALIST

## 2024-09-23 ASSESSMENT — ENCOUNTER SYMPTOMS: SHORTNESS OF BREATH: 0

## 2024-09-25 ENCOUNTER — HOSPITAL ENCOUNTER (OUTPATIENT)
Dept: PHYSICAL THERAPY | Facility: HOSPITAL | Age: 79
Setting detail: RECURRING SERIES
Discharge: HOME | End: 2024-09-28
Payer: MEDICARE

## 2024-09-25 PROCEDURE — 97161 PT EVAL LOW COMPLEX 20 MIN: CPT | Performed by: PHYSICAL THERAPIST

## 2024-09-25 PROCEDURE — 97110 THERAPEUTIC EXERCISES: CPT | Performed by: PHYSICAL THERAPIST

## 2024-09-25 PROCEDURE — 97535 SELF CARE MNGMENT TRAINING: CPT | Performed by: PHYSICAL THERAPIST

## 2024-10-02 ENCOUNTER — HOSPITAL ENCOUNTER (OUTPATIENT)
Dept: PHYSICAL THERAPY | Facility: HOSPITAL | Age: 79
Setting detail: RECURRING SERIES
Discharge: HOME | End: 2024-10-05
Payer: MEDICARE

## 2024-10-02 PROCEDURE — 97110 THERAPEUTIC EXERCISES: CPT | Performed by: PHYSICAL THERAPIST

## 2024-10-02 PROCEDURE — 97140 MANUAL THERAPY 1/> REGIONS: CPT | Performed by: PHYSICAL THERAPIST

## 2024-10-02 NOTE — PROGRESS NOTES
and increase tissue extensibility to improve patient's ability to progress to PLOF and address remaining functional goals.  The manual therapy interventions were performed at a separate and distinct time from the therapeutic activities interventions . (see flow sheet as applicable)     Details if applicable:  seated knee distraction, prone STM/MFR to popliteal space, proximal calf and distal HS region   56 56    Total Total   [x]  Patient Education billed concurrently with other procedures   [x] Review HEP    [] Progressed/Changed HEP, detail:    [] Other detail:         Other Objective/Functional Measures  --    Pain Level at end of session (0-10 scale): 1      Assessment   Reported complete pain relief after manual interventions. Still seems more likely to be a knee/LE-centric issue v. Sciatica. Will continue to progress per tolerance.   Patient will continue to benefit from skilled PT / OT services to modify and progress therapeutic interventions, analyze and address functional mobility deficits, analyze and address ROM deficits, analyze and address strength deficits, analyze and address soft tissue restrictions, and analyze and cue for proper movement patterns to address functional deficits and attain remaining goals.    Progress toward goals / Updated goals:  []  See Progress Note/Recertification  Long Term Goals: To be accomplished in 16 treatments.  Pt will be able to walk from her car into the museum without increase in LE pain  Pt will be able to stand at for at least 1 hour without increase in symptoms  Improved FOTO score to 66 or better to demonstrate improved function  Pt will be able to self-manage care using updated HEP for improved independence  PLAN  Yes  Continue plan of care  Re-Cert Due: 16 visits   []  Upgrade activities as tolerated  []  Discharge due to:  []  Other:      ASHLIE DELCID, PT       10/2/2024       11:04 AM

## 2024-10-09 ENCOUNTER — HOSPITAL ENCOUNTER (OUTPATIENT)
Dept: PHYSICAL THERAPY | Facility: HOSPITAL | Age: 79
Setting detail: RECURRING SERIES
Discharge: HOME | End: 2024-10-12
Payer: MEDICARE

## 2024-10-09 PROCEDURE — 97110 THERAPEUTIC EXERCISES: CPT | Performed by: PHYSICAL THERAPIST

## 2024-10-09 PROCEDURE — 97140 MANUAL THERAPY 1/> REGIONS: CPT | Performed by: PHYSICAL THERAPIST

## 2024-10-09 NOTE — PROGRESS NOTES
PHYSICAL THERAPY - MEDICARE DAILY TREATMENT NOTE (updated 3/23)      Date: 10/9/2024          Patient Name:  Shelley Garcia :  1945   Medical   Diagnosis:  Sciatica, left side [M54.32] Treatment Diagnosis:  M25.552  LEFT HIP PAIN and M25.562  LEFT KNEE PAIN  and M54.32  SCIATICA, LEFT SIDE    Referral Source:  Sandra Jang APRN * Insurance:   Payor: MEDICARE / Plan: MEDICARE PART A AND B / Product Type: *No Product type* /                     Patient  verified yes     Visit #   Current  / Total 3 16   Time   In / Out 1100a 1155a   Total Treatment Time 55   Total Timed Codes 55   1:1 Treatment Time 55      [facility] Totals Reminder:  bill using total billable   min of TIMED therapeutic procedures and modalities.   8-22 min = 1 unit; 23-37 min = 2 units; 38-52 min = 3 units; 53-67 min = 4 units; 68-82 min = 5 units        SUBJECTIVE    Pain Level (0-10 scale): 4    Any medication changes, allergies to medications, adverse drug reactions, diagnosis change, or new procedure performed?: [x] No    [] Yes (see summary sheet for update)  Medications: Verified on Patient Summary List    Subjective functional status/changes:     Felt better after last visit. Pain is still worst in the back of the thigh/knee. Stretched her calf last week and felt a pop in the knee, and afterwards things felt better.     OBJECTIVE  Therapeutic Procedures:  Tx Min Billable or 1:1 Min (if diff from Tx Min) Procedure, Rationale, Specifics   40  02112 Therapeutic Exercise (timed):  increase ROM, strength, coordination, balance, and proprioception to improve patient's ability to progress to PLOF and address remaining functional goals. (see flow sheet as applicable)     Details if applicable:     73 [id number] Manual Therapy (timed):  decrease pain, increase ROM, and increase tissue extensibility to improve patient's ability to progress to PLOF and address remaining functional goals.  The manual therapy interventions were performed at a

## 2024-10-16 ENCOUNTER — HOSPITAL ENCOUNTER (OUTPATIENT)
Dept: PHYSICAL THERAPY | Facility: HOSPITAL | Age: 79
Setting detail: RECURRING SERIES
Discharge: HOME | End: 2024-10-19
Payer: MEDICARE

## 2024-10-16 PROCEDURE — 97110 THERAPEUTIC EXERCISES: CPT | Performed by: PHYSICAL THERAPIST

## 2024-10-16 PROCEDURE — 97140 MANUAL THERAPY 1/> REGIONS: CPT | Performed by: PHYSICAL THERAPIST

## 2024-10-16 NOTE — PROGRESS NOTES
separate and distinct time from the therapeutic activities interventions . (see flow sheet as applicable)     Details if applicable:  seated knee distraction, prone STM/MFR to popliteal space, proximal calf and distal HS region   36 31    Total Total   [x]  Patient Education billed concurrently with other procedures   [x] Review HEP    [] Progressed/Changed HEP, detail:    [] Other detail:         Other Objective/Functional Measures  --    Pain Level at end of session (0-10 scale): 0      Assessment   Getting significant relief at each session with manual interventions. Continues to indicate LE-centric symptoms. Held on most exercises today as she had a persistent cough and wasn't feeling 100%. Will incorporate her exercises back in next session.   Patient will continue to benefit from skilled PT / OT services to modify and progress therapeutic interventions, analyze and address functional mobility deficits, analyze and address ROM deficits, analyze and address strength deficits, analyze and address soft tissue restrictions, and analyze and cue for proper movement patterns to address functional deficits and attain remaining goals.    Progress toward goals / Updated goals:  []  See Progress Note/Recertification  Long Term Goals: To be accomplished in 16 treatments.  Pt will be able to walk from her car into the museum without increase in LE pain PROGRESSING  Pt will be able to stand at for at least 1 hour without increase in symptoms  Improved FOTO score to 66 or better to demonstrate improved function  Pt will be able to self-manage care using updated HEP for improved independence    PLAN  Yes  Continue plan of care  Re-Cert Due: 16 visits   []  Upgrade activities as tolerated  []  Discharge due to:  []  Other:      ASHLIE DELCID, PT       10/16/2024       11:02 AM

## 2024-10-18 ENCOUNTER — OFFICE VISIT (OUTPATIENT)
Age: 79
End: 2024-10-18
Payer: MEDICARE

## 2024-10-18 VITALS
WEIGHT: 126 LBS | HEIGHT: 63 IN | SYSTOLIC BLOOD PRESSURE: 120 MMHG | BODY MASS INDEX: 22.32 KG/M2 | DIASTOLIC BLOOD PRESSURE: 76 MMHG | HEART RATE: 85 BPM | OXYGEN SATURATION: 97 %

## 2024-10-18 DIAGNOSIS — N30.00 ACUTE CYSTITIS WITHOUT HEMATURIA: Primary | ICD-10-CM

## 2024-10-18 DIAGNOSIS — R30.0 DYSURIA: ICD-10-CM

## 2024-10-18 LAB
BILIRUBIN, URINE, POC: NEGATIVE
BLOOD URINE, POC: NORMAL
GLUCOSE URINE, POC: NEGATIVE
KETONES, URINE, POC: NEGATIVE
LEUKOCYTE ESTERASE, URINE, POC: NORMAL
NITRITE, URINE, POC: POSITIVE
PH, URINE, POC: 7 (ref 4.6–8)
PROTEIN,URINE, POC: NORMAL
SPECIFIC GRAVITY, URINE, POC: 1.02 (ref 1–1.03)
URINALYSIS CLARITY, POC: NORMAL
URINALYSIS COLOR, POC: NORMAL
UROBILINOGEN, POC: NORMAL

## 2024-10-18 PROCEDURE — 99213 OFFICE O/P EST LOW 20 MIN: CPT | Performed by: CLINICAL NURSE SPECIALIST

## 2024-10-18 PROCEDURE — PBSHW AMB POC URINALYSIS DIP STICK AUTO W/O MICRO: Performed by: CLINICAL NURSE SPECIALIST

## 2024-10-18 PROCEDURE — G8427 DOCREV CUR MEDS BY ELIG CLIN: HCPCS | Performed by: CLINICAL NURSE SPECIALIST

## 2024-10-18 PROCEDURE — G8399 PT W/DXA RESULTS DOCUMENT: HCPCS | Performed by: CLINICAL NURSE SPECIALIST

## 2024-10-18 PROCEDURE — G8420 CALC BMI NORM PARAMETERS: HCPCS | Performed by: CLINICAL NURSE SPECIALIST

## 2024-10-18 PROCEDURE — 1123F ACP DISCUSS/DSCN MKR DOCD: CPT | Performed by: CLINICAL NURSE SPECIALIST

## 2024-10-18 PROCEDURE — 1090F PRES/ABSN URINE INCON ASSESS: CPT | Performed by: CLINICAL NURSE SPECIALIST

## 2024-10-18 PROCEDURE — 4004F PT TOBACCO SCREEN RCVD TLK: CPT | Performed by: CLINICAL NURSE SPECIALIST

## 2024-10-18 PROCEDURE — G8484 FLU IMMUNIZE NO ADMIN: HCPCS | Performed by: CLINICAL NURSE SPECIALIST

## 2024-10-18 PROCEDURE — 81003 URINALYSIS AUTO W/O SCOPE: CPT | Performed by: CLINICAL NURSE SPECIALIST

## 2024-10-18 RX ORDER — NITROFURANTOIN 25; 75 MG/1; MG/1
100 CAPSULE ORAL 2 TIMES DAILY
Qty: 10 CAPSULE | Refills: 0 | Status: SHIPPED | OUTPATIENT
Start: 2024-10-18 | End: 2024-10-23

## 2024-10-18 ASSESSMENT — ENCOUNTER SYMPTOMS: BACK PAIN: 0

## 2024-10-18 NOTE — PROGRESS NOTES
Shelley Garcia (:  1945) is a 79 y.o. female,Established patient, here for evaluation of the following chief complaint(s):  Urinary Frequency and Urinary Pain (It stings when she urinates. She has been taking cranberry juice which has helped. /)         Assessment & Plan  Acute cystitis without hematuria    Will cover with Macrobid, educated on indication, use, and potential side effects. Encouraged to decrease caffeine intake and to increase water intake. We will culture urine.      Orders:    nitrofurantoin, macrocrystal-monohydrate, (MACROBID) 100 MG capsule; Take 1 capsule by mouth 2 times daily for 5 days    Dysuria    Orders:    AMB POC URINALYSIS DIP STICK AUTO W/O MICRO    Culture, Urine    Will follow up pending urine culture and discuss any additional plan of care.   Encouraged to call with any questions or concerns in the interim.   Return if symptoms worsen or fail to improve.       Subjective   Ms. Garcia presents for a problem visit. Over the course of about one week, she has been dealing with burning with urination. She denies any frequency, urgency, or blood in her urine. Started to drink cranberry juice which did provide some relief. She denies any abdominal pain, back pain, fever or chills.         Review of Systems   Constitutional:  Negative for chills, fatigue and fever.   Genitourinary:  Positive for dysuria. Negative for hematuria and urgency.   Musculoskeletal:  Negative for back pain.          Objective   Physical Exam  Constitutional:       Appearance: She is well-developed and well-groomed.   Cardiovascular:      Rate and Rhythm: Normal rate and regular rhythm.      Pulses: Normal pulses.      Heart sounds: Normal heart sounds.   Pulmonary:      Effort: Pulmonary effort is normal.      Breath sounds: Normal breath sounds.   Abdominal:      Tenderness: There is no abdominal tenderness. There is no right CVA tenderness or left CVA tenderness.   Neurological:      Mental Status:

## 2024-10-18 NOTE — PROGRESS NOTES
Chief Complaint   Patient presents with    Urinary Frequency    Urinary Pain     It stings when she urinates. She has been taking cranberry juice which has helped.        \"Have you been to the ER, urgent care clinic since your last visit?  Hospitalized since your last visit?\"    NO    “Have you seen or consulted any other health care providers outside our system since your last visit?”    NO

## 2024-10-19 LAB — BACTERIA UR CULT: ABNORMAL

## 2024-10-21 ENCOUNTER — TELEPHONE (OUTPATIENT)
Age: 79
End: 2024-10-21

## 2024-10-21 LAB — BACTERIA UR CULT: ABNORMAL

## 2024-10-21 NOTE — TELEPHONE ENCOUNTER
Pt states she is feeling much better. She is not currently having any urinary difficulties/symptoms. Pt however does state concern on why she developed a UTI as she searched what caused UTI's and none of what she read applied to her. Pt also stated she would be willing to repeat a urine culture if needed.     ----- Message from YOANDY العلي CNP sent at 10/21/2024 12:20 PM EDT -----  Please ask how her symptoms are. It is unclear if the antibiotic that she was put on will cover the infection so if symptoms are unimproved, I will switch the abx.

## 2024-10-23 ENCOUNTER — HOSPITAL ENCOUNTER (OUTPATIENT)
Dept: PHYSICAL THERAPY | Facility: HOSPITAL | Age: 79
Setting detail: RECURRING SERIES
Discharge: HOME | End: 2024-10-26
Payer: MEDICARE

## 2024-10-23 PROCEDURE — 97140 MANUAL THERAPY 1/> REGIONS: CPT

## 2024-10-23 PROCEDURE — 97110 THERAPEUTIC EXERCISES: CPT

## 2024-10-23 NOTE — PROGRESS NOTES
PHYSICAL THERAPY - MEDICARE DAILY TREATMENT NOTE (updated 3/23)      Date: 10/23/2024          Patient Name:  Shelley Garcia :  1945   Medical   Diagnosis:  Sciatica, left side [M54.32] Treatment Diagnosis:  M25.552  LEFT HIP PAIN and M25.562  LEFT KNEE PAIN  and M54.32  SCIATICA, LEFT SIDE    Referral Source:  Sandra Jang APRN * Insurance:   Payor: MEDICARE / Plan: MEDICARE PART A AND B / Product Type: *No Product type* /                     Patient  verified yes     Visit #   Current  / Total 5 16   Time   In / Out 10:00A 10:45A   Total Treatment Time 45   Total Timed Codes 45   1:1 Treatment Time 40      [facility] Totals Reminder:  bill using total billable   min of TIMED therapeutic procedures and modalities.   8-22 min = 1 unit; 23-37 min = 2 units; 38-52 min = 3 units; 53-67 min = 4 units; 68-82 min = 5 units        SUBJECTIVE    Pain Level (0-10 scale): 3/10    Any medication changes, allergies to medications, adverse drug reactions, diagnosis change, or new procedure performed?: [x] No    [] Yes (see summary sheet for update)  Medications: Verified on Patient Summary List    Subjective functional status/changes:     Pt reports that she still feels some tightness in the leg. Still struggling with her allergies.     OBJECTIVE  Therapeutic Procedures:  Tx Min Billable or 1:1 Min (if diff from Tx Min) Procedure, Rationale, Specifics   30 25 [id number] Therapeutic Exercise (timed):  increase ROM, strength, coordination, balance, and proprioception to improve patient's ability to progress to PLOF and address remaining functional goals. (see flow sheet as applicable)     Details if applicable:     15  82104 Manual Therapy (timed):  decrease pain, increase ROM, and increase tissue extensibility to improve patient's ability to progress to PLOF and address remaining functional goals.  The manual therapy interventions were performed at a separate and distinct time from the therapeutic activities interventions

## 2024-10-30 ENCOUNTER — HOSPITAL ENCOUNTER (OUTPATIENT)
Dept: PHYSICAL THERAPY | Facility: HOSPITAL | Age: 79
Setting detail: RECURRING SERIES
Discharge: HOME | End: 2024-11-02
Payer: MEDICARE

## 2024-10-30 PROCEDURE — 97140 MANUAL THERAPY 1/> REGIONS: CPT

## 2024-10-30 PROCEDURE — 97110 THERAPEUTIC EXERCISES: CPT

## 2024-10-30 NOTE — PROGRESS NOTES
PHYSICAL THERAPY - MEDICARE DAILY TREATMENT NOTE (updated 3/23)      Date: 10/30/2024          Patient Name:  Shelley Garcia :  1945   Medical   Diagnosis:  Sciatica, left side [M54.32] Treatment Diagnosis:  M25.552  LEFT HIP PAIN and M25.562  LEFT KNEE PAIN  and M54.32  SCIATICA, LEFT SIDE    Referral Source:  Sandra Jang APRN * Insurance:   Payor: MEDICARE / Plan: MEDICARE PART A AND B / Product Type: *No Product type* /                     Patient  verified yes     Visit #   Current  / Total 6 16   Time   In / Out 10:48A 10:47A   Total Treatment Time 59   Total Timed Codes 59   1:1 Treatment Time 54      [facility] Totals Reminder:  bill using total billable   min of TIMED therapeutic procedures and modalities.   8-22 min = 1 unit; 23-37 min = 2 units; 38-52 min = 3 units; 53-67 min = 4 units; 68-82 min = 5 units        SUBJECTIVE    Pain Level (0-10 scale): 1-2/10    Any medication changes, allergies to medications, adverse drug reactions, diagnosis change, or new procedure performed?: [x] No    [] Yes (see summary sheet for update)  Medications: Verified on Patient Summary List    Subjective functional status/changes:     Pt reported that shortening her stride takes away the pain when she walks.     OBJECTIVE  Therapeutic Procedures:  Tx Min Billable or 1:1 Min (if diff from Tx Min) Procedure, Rationale, Specifics   44 [id number] Therapeutic Exercise (timed):  increase ROM, strength, coordination, balance, and proprioception to improve patient's ability to progress to PLOF and address remaining functional goals. (see flow sheet as applicable)     Details if applicable:     15  66865 Manual Therapy (timed):  decrease pain, increase ROM, and increase tissue extensibility to improve patient's ability to progress to PLOF and address remaining functional goals.  The manual therapy interventions were performed at a separate and distinct time from the therapeutic activities interventions . (see flow sheet

## 2024-11-06 ENCOUNTER — HOSPITAL ENCOUNTER (OUTPATIENT)
Dept: PHYSICAL THERAPY | Facility: HOSPITAL | Age: 79
Setting detail: RECURRING SERIES
Discharge: HOME | End: 2024-11-09
Payer: MEDICARE

## 2024-11-06 PROCEDURE — 97140 MANUAL THERAPY 1/> REGIONS: CPT | Performed by: PHYSICAL THERAPIST

## 2024-11-06 PROCEDURE — 97110 THERAPEUTIC EXERCISES: CPT | Performed by: PHYSICAL THERAPIST

## 2024-11-06 NOTE — PROGRESS NOTES
PHYSICAL THERAPY - MEDICARE DAILY TREATMENT NOTE (updated 3/23)      Date: 2024          Patient Name:  Shelley Garcia :  1945   Medical   Diagnosis:  Sciatica, left side [M54.32] Treatment Diagnosis:  M25.552  LEFT HIP PAIN and M25.562  LEFT KNEE PAIN  and M54.32  SCIATICA, LEFT SIDE    Referral Source:  Sandra Jang APRN * Insurance:   Payor: MEDICARE / Plan: MEDICARE PART A AND B / Product Type: *No Product type* /                     Patient  verified yes     Visit #   Current  / Total 7 16   Time   In / Out 230p 315p   Total Treatment Time 45   Total Timed Codes 45   1:1 Treatment Time 40      [facility] Totals Reminder:  bill using total billable   min of TIMED therapeutic procedures and modalities.   8-22 min = 1 unit; 23-37 min = 2 units; 38-52 min = 3 units; 53-67 min = 4 units; 68-82 min = 5 units        SUBJECTIVE    Pain Level (0-10 scale): 2-3/10    Any medication changes, allergies to medications, adverse drug reactions, diagnosis change, or new procedure performed?: [x] No    [] Yes (see summary sheet for update)  Medications: Verified on Patient Summary List    Subjective functional status/changes:     Pt reported that shortening her stride takes away the pain when she walks.     OBJECTIVE  Therapeutic Procedures:  Tx Min Billable or 1:1 Min (if diff from Tx Min) Procedure, Rationale, Specifics   30 25 21806 Therapeutic Exercise (timed):  increase ROM, strength, coordination, balance, and proprioception to improve patient's ability to progress to PLOF and address remaining functional goals. (see flow sheet as applicable)     Details if applicable:     15  01680 Manual Therapy (timed):  decrease pain, increase ROM, and increase tissue extensibility to improve patient's ability to progress to PLOF and address remaining functional goals.  The manual therapy interventions were performed at a separate and distinct time from the therapeutic activities interventions . (see flow sheet as

## 2024-11-26 ENCOUNTER — TELEMEDICINE (OUTPATIENT)
Age: 79
End: 2024-11-26
Payer: MEDICARE

## 2024-11-26 DIAGNOSIS — M79.662 PAIN OF LEFT LOWER LEG: ICD-10-CM

## 2024-11-26 PROCEDURE — 1159F MED LIST DOCD IN RCRD: CPT | Performed by: CLINICAL NURSE SPECIALIST

## 2024-11-26 PROCEDURE — 4004F PT TOBACCO SCREEN RCVD TLK: CPT | Performed by: CLINICAL NURSE SPECIALIST

## 2024-11-26 PROCEDURE — G8484 FLU IMMUNIZE NO ADMIN: HCPCS | Performed by: CLINICAL NURSE SPECIALIST

## 2024-11-26 PROCEDURE — 1090F PRES/ABSN URINE INCON ASSESS: CPT | Performed by: CLINICAL NURSE SPECIALIST

## 2024-11-26 PROCEDURE — 1123F ACP DISCUSS/DSCN MKR DOCD: CPT | Performed by: CLINICAL NURSE SPECIALIST

## 2024-11-26 PROCEDURE — G8399 PT W/DXA RESULTS DOCUMENT: HCPCS | Performed by: CLINICAL NURSE SPECIALIST

## 2024-11-26 PROCEDURE — G8420 CALC BMI NORM PARAMETERS: HCPCS | Performed by: CLINICAL NURSE SPECIALIST

## 2024-11-26 PROCEDURE — 99213 OFFICE O/P EST LOW 20 MIN: CPT | Performed by: CLINICAL NURSE SPECIALIST

## 2024-11-26 PROCEDURE — G8427 DOCREV CUR MEDS BY ELIG CLIN: HCPCS | Performed by: CLINICAL NURSE SPECIALIST

## 2024-11-26 ASSESSMENT — ENCOUNTER SYMPTOMS
BACK PAIN: 0
SHORTNESS OF BREATH: 0

## 2024-11-26 NOTE — PROGRESS NOTES
Chief Complaint   Patient presents with    Leg Pain     Left Leg - Pt states pain is less after physical therapy sessions. Pt reports when sitting on chair she tries to not put pressure on left side and that helps for the pain to not trigger.      \"Have you been to the ER, urgent care clinic since your last visit?  Hospitalized since your last visit?\"    NO    “Have you seen or consulted any other health care providers outside our system since your last visit?”    NO

## 2024-11-26 NOTE — PROGRESS NOTES
Shelley Garcia, was evaluated through a synchronous (real-time) audio-video encounter. The patient (or guardian if applicable) is aware that this is a billable service, which includes applicable co-pays. This Virtual Visit was conducted with patient's (and/or legal guardian's) consent. Patient identification was verified, and a caregiver was present when appropriate.   The patient was located at Home: [address] [place] [id number]  Provider was located at Home [place]Appt [place] State[place]  Confirm you are appropriately licensed, registered, or certified to deliver care in the state where the patient is located as indicated above. If you are not or unsure, please re-schedule the visit: Yes, I confirm.     Shelley Garcia (:  1945) is a Established patient, presenting virtually for evaluation of the following:      Below is the assessment and plan developed based on review of pertinent history, physical exam, labs, studies, and medications.     Assessment & Plan  Pain of left lower leg  Improving, continue intermittent heat/ice application as tolerated.  We will obtain imaging though low suspicion for bony abnormality.    Orders:    XR TIBIA FIBULA LEFT (2 VIEWS); Future    Will follow-up pending test result and discuss any additional plan of care.  Return if symptoms worsen or fail to improve.       Subjective   Ms. Garcia presents for a follow-up visit.  Was previously seen for left leg pain, underwent physical therapy.  Admits that the pain has greatly improved.  She still at times is experiencing much pressure in her leg however.  Feels that it starts behind her knee but is also able to be felt in her thigh.  Was previously taking ibuprofen, but has not needed this recently.  Was able to walk a mile yesterday.  Admits that while she was previously certain that symptoms are related to sciatica, she is no longer confident about this anymore.      Review of Systems   Respiratory:  Negative for

## 2024-11-27 ENCOUNTER — HOSPITAL ENCOUNTER (OUTPATIENT)
Facility: HOSPITAL | Age: 79
Discharge: HOME | End: 2024-11-30
Payer: MEDICARE

## 2024-11-27 DIAGNOSIS — M79.662 PAIN OF LEFT LOWER LEG: ICD-10-CM

## 2024-11-27 PROCEDURE — 73590 X-RAY EXAM OF LOWER LEG: CPT

## 2024-12-19 ENCOUNTER — ANESTHESIA (OUTPATIENT)
Facility: HOSPITAL | Age: 79
End: 2024-12-19
Payer: MEDICARE

## 2024-12-19 ENCOUNTER — ANESTHESIA EVENT (OUTPATIENT)
Facility: HOSPITAL | Age: 79
End: 2024-12-19
Payer: MEDICARE

## 2024-12-19 ENCOUNTER — HOSPITAL ENCOUNTER (OUTPATIENT)
Facility: HOSPITAL | Age: 79
Setting detail: OUTPATIENT SURGERY
Discharge: HOME | End: 2024-12-19
Attending: INTERNAL MEDICINE | Admitting: INTERNAL MEDICINE
Payer: MEDICARE

## 2024-12-19 VITALS
WEIGHT: 128 LBS | RESPIRATION RATE: 14 BRPM | HEIGHT: 63 IN | DIASTOLIC BLOOD PRESSURE: 69 MMHG | OXYGEN SATURATION: 99 % | BODY MASS INDEX: 22.68 KG/M2 | SYSTOLIC BLOOD PRESSURE: 125 MMHG | HEART RATE: 74 BPM | TEMPERATURE: 97.5 F

## 2024-12-19 PROCEDURE — 7100000010 HC PHASE II RECOVERY - FIRST 15 MIN: Performed by: INTERNAL MEDICINE

## 2024-12-19 PROCEDURE — 2709999900 HC NON-CHARGEABLE SUPPLY: Performed by: INTERNAL MEDICINE

## 2024-12-19 PROCEDURE — 3600007502: Performed by: INTERNAL MEDICINE

## 2024-12-19 PROCEDURE — 6360000002 HC RX W HCPCS: Performed by: NURSE ANESTHETIST, CERTIFIED REGISTERED

## 2024-12-19 PROCEDURE — 7100000011 HC PHASE II RECOVERY - ADDTL 15 MIN: Performed by: INTERNAL MEDICINE

## 2024-12-19 PROCEDURE — 3700000000 HC ANESTHESIA ATTENDED CARE: Performed by: INTERNAL MEDICINE

## 2024-12-19 RX ORDER — SODIUM CHLORIDE 0.9 % (FLUSH) 0.9 %
5-40 SYRINGE (ML) INJECTION PRN
Status: DISCONTINUED | OUTPATIENT
Start: 2024-12-19 | End: 2024-12-19 | Stop reason: HOSPADM

## 2024-12-19 RX ORDER — SODIUM CHLORIDE 9 MG/ML
INJECTION, SOLUTION INTRAVENOUS PRN
Status: DISCONTINUED | OUTPATIENT
Start: 2024-12-19 | End: 2024-12-19 | Stop reason: HOSPADM

## 2024-12-19 RX ORDER — SODIUM CHLORIDE 9 MG/ML
INJECTION, SOLUTION INTRAVENOUS CONTINUOUS
Status: DISCONTINUED | OUTPATIENT
Start: 2024-12-19 | End: 2024-12-19 | Stop reason: HOSPADM

## 2024-12-19 RX ORDER — SODIUM CHLORIDE 0.9 % (FLUSH) 0.9 %
5-40 SYRINGE (ML) INJECTION EVERY 12 HOURS SCHEDULED
Status: DISCONTINUED | OUTPATIENT
Start: 2024-12-19 | End: 2024-12-19 | Stop reason: HOSPADM

## 2024-12-19 RX ADMIN — PROPOFOL 50 MG: 10 INJECTION, EMULSION INTRAVENOUS at 10:14

## 2024-12-19 RX ADMIN — PROPOFOL 50 MG: 10 INJECTION, EMULSION INTRAVENOUS at 10:03

## 2024-12-19 RX ADMIN — PROPOFOL 50 MG: 10 INJECTION, EMULSION INTRAVENOUS at 10:04

## 2024-12-19 RX ADMIN — PROPOFOL 50 MG: 10 INJECTION, EMULSION INTRAVENOUS at 10:09

## 2024-12-19 RX ADMIN — LIDOCAINE HYDROCHLORIDE 40 MG: 20 INJECTION, SOLUTION EPIDURAL; INFILTRATION; INTRACAUDAL; PERINEURAL at 10:03

## 2024-12-19 NOTE — PROGRESS NOTES
Verified patient name and date of birth, scheduled procedure, and informed consent. Reviewed general discharge instructions and  information.  Assessed patient. Awake, alert, and oriented per baseline. Vital signs stable (see vital sign flowsheet). Respiratory status within defined limits, abdomen soft and non tender. Skin with in defined limits.     Initial RN admission and assessment performed and documented in Endoscopy navigator.     Patient evaluated by anesthesia in pre-procedure holding.     All procedural vital signs, airway assessment, and level of consciousness information monitored and recorded by anesthesia staff on the anesthesia record.     Report received from CRNA post procedure.  Patient transported to recovery area by RN.    Endoscopy post procedure time out was performed and specimens were verified with physician.    Endoscope was pre-cleaned at bedside immediately following procedure by Keeley.

## 2024-12-19 NOTE — OP NOTE
[place] [id number]        Colonoscopy Operative Report    Shelley Garcia  [id number]  1945      Procedure Type:   Colonoscopy --screening     Indications:  history of colon polyps        Pre-operative Diagnosis: see indication above    Post-operative Diagnosis:  See findings below    :  Sukumar Malik MD    Staff: Circulator: Shanita Grier RN  Endoscopy Technician: Keeley Sepulveda     Referring Provider: Rashaun Rodríguez DO      Sedation:  MAC      Procedure Details:  After informed consent was obtained with all risks and benefits of procedure explained and preoperative exam completed, the patient was taken to the endoscopy suite and placed in the left lateral decubitus position.  Upon sequential sedation as per above, a digital rectal exam was performed demonstrating internal hemorrhoids.  The Olympus pediatric videocolonoscope  was inserted in the rectum and carefully advanced to the terminal ileum.  The cecum was identified by the ileocecal valve and appendiceal orifice.  The quality of preparation was good.  The colonoscope was slowly withdrawn with careful evaluation between folds. Retroflexion in the rectum was completed .     Findings:   Mild left-sided diverticulosis.      Specimen Removed:  none    Complications: None.     EBL:  None.    Impression:     As above    Recommendations:  High fiber diet education  Repeat colonoscopy in 5 years    Signed By: Sukumar Malik MD     12/19/2024  10:25 AM

## 2024-12-19 NOTE — H&P
[facility] [place] [id number]        History and Physical       NAME:  Shelley Garcia   :   1945   MRN:   [id number]             History of Present Illness:  Patient is a 79 y.o. who is seen for history of colon polyps.     PMH:  Past Medical History:   Diagnosis Date    Arthritis     spine    Cancer (HCC) DCIS in     Ductal carcinoma in situ (DCIS) of right breast 2009    lumpectomy x2, XRT (followed by Dr. Zaidi)    History of colon polyps     Osteopenia 2016    mild, R fem neck -1.0       PSH:  Past Surgical History:   Procedure Laterality Date    BLADDER SUSPENSION      BREAST LUMPECTOMY      x2 (right)    COLONOSCOPY N/A 2019    COLONOSCOPY performed by Sukumar Malik MD at [facility] ENDOSCOPY    COLONOSCOPY      normal (but has had a h/o colon polyps), repeat q5 yrs, Dr. Juan A Clark    HYSTERECTOMY, TOTAL ABDOMINAL (CERVIX REMOVED)  around        Allergies:  Allergies   Allergen Reactions    Penicillins Anaphylaxis       Home Medications:  None       Hospital Medications:  Current Facility-Administered Medications   Medication Dose Route Frequency    0.9 % sodium chloride infusion   IntraVENous Continuous    sodium chloride flush 0.9 % injection 5-40 mL  5-40 mL IntraVENous 2 times per day    sodium chloride flush 0.9 % injection 5-40 mL  5-40 mL IntraVENous PRN    0.9 % sodium chloride infusion   IntraVENous PRN       Social History:  Social History     Tobacco Use    Smoking status: Never    Smokeless tobacco: Never   Substance Use Topics    Alcohol use: No       Family History:  Family History   Problem Relation Age of Onset    Cancer Mother         breast, bone    Cancer Father         kidney    No Known Problems Sister              Review of Systems:      Constitutional: negative fever, negative chills, negative weight loss  Eyes:   negative visual changes  ENT:   negative sore throat, tongue or lip swelling  Respiratory:  negative

## 2024-12-19 NOTE — ANESTHESIA POSTPROCEDURE EVALUATION
Department of Anesthesiology  Postprocedure Note    Patient: Shelley Garcia  MRN: [id number]  YOB: 1945  Date of evaluation: 12/19/2024    Procedure Summary       Date: 12/19/24 Room / Location: [facility] ENDO 01 / [facility]    Anesthesia Start: 1000 Anesthesia Stop: 1018    Procedure: COLONOSCOPY Diagnosis:       Personal history of colon polyps, unspecified      (Personal history of colon polyps, unspecified [Z86.0100])    Surgeons: Sukumar Malik MD Responsible Provider: Alex Ureña MD    Anesthesia Type: MAC ASA Status: 2            Anesthesia Type: MAC    Pablo Phase I: Pablo Score: 10    Pablo Phase II: Pablo Score: 9    Anesthesia Post Evaluation    Patient location during evaluation: PACU  Patient participation: complete - patient participated  Level of consciousness: awake  Airway patency: patent  Nausea & Vomiting: no nausea  Cardiovascular status: blood pressure returned to baseline and hemodynamically stable  Respiratory status: acceptable  Hydration status: stable  Multimodal analgesia pain management approach    No notable events documented.

## 2024-12-19 NOTE — DISCHARGE INSTRUCTIONS
[facility]  [address], [place] [address]    COLON DISCHARGE INSTRUCTIONS    Shelley Garcia  [id number]  1945    Discomfort:  Redness at IV site- apply warm compress to area; if redness or soreness persist- contact your physician  There may be a slight amount of blood passed from the rectum  Gaseous discomfort- walking, belching will help relieve any discomfort  You may not operate a vehicle for 12 hours  You may not engage in an occupation involving machinery or appliances for rest of today  You may not drink alcoholic beverages for at least 12 hours  Avoid making any critical decisions for at least 24 hour  DIET:  You may resume your regular diet - however -  remember your colon is empty and a heavy meal will produce gas.  Avoid these foods:  vegetables, fried / greasy foods, carbonated drinks     ACTIVITY:  You may  resume your normal daily activities it is recommended that you spend the remainder of the day resting -  avoid any strenuous activity.    CALL M.D.  ANY SIGN OF:   Increasing pain, nausea, vomiting  Abdominal distension (swelling)  New increased bleeding (oral or rectal)  Fever (chills)  Pain in chest area  Bloody discharge from nose or mouth  Shortness of breath      Follow-up Instructions:   Call Dr. Sukumar Malik for any questions or problems at [phone]          ENDOSCOPY FINDINGS:   Your colonoscopy showed no polyps. Your next colonoscopy will be due in 5 years. Please maintain a high fiber diet.  Telephone # [phone]      Signed By: Sukumar Malik MD     12/19/2024  10:25 AM         no

## 2024-12-19 NOTE — ANESTHESIA PRE PROCEDURE
FB   Neck ROM: full  Mouth opening: > = 3 FB   Dental: normal exam         Pulmonary:Negative Pulmonary ROS breath sounds clear to auscultation                             Cardiovascular:Negative CV ROS  Exercise tolerance: good (>4 METS)          Rhythm: regular  Rate: normal                    Neuro/Psych:   Negative Neuro/Psych ROS              GI/Hepatic/Renal: Neg GI/Hepatic/Renal ROS            Endo/Other: Negative Endo/Other ROS                    Abdominal: normal exam            Vascular: negative vascular ROS.         Other Findings:       Anesthesia Plan      MAC     ASA 2       Induction: intravenous.      Anesthetic plan and risks discussed with patient.      Plan discussed with CRNA.                Alex Ureña MD   12/19/2024

## (undated) DEVICE — SNARE ENDOSCP M L240CM W27MM SHTH DIA2.4MM CHN 2.8MM OVL

## (undated) DEVICE — QUILTED PREMIUM COMFORT UNDERPAD,EXTRA HEAVY: Brand: WINGS

## (undated) DEVICE — KENDALL RADIOLUCENT FOAM MONITORING ELECTRODE -RECTANGULAR SHAPE: Brand: KENDALL

## (undated) DEVICE — SYR 50ML SLIP TIP NSAF LF STRL --

## (undated) DEVICE — Device: Brand: MEDICAL ACTION INDUSTRIES

## (undated) DEVICE — TRAP SURG QUAD PARABOLA SLOT DSGN SFTY SCRN TRAPEASE

## (undated) DEVICE — ENDO CARRY-ON PROCEDURE KIT INCLUDES ENZYMATIC SPONGE, GAUZE, BIOHAZARD LABEL, TRAY, LUBRICANT, DIRTY SCOPE LABEL, WATER LABEL, TRAY, DRAWSTRING PAD, AND DEFENDO 4-PIECE KIT.: Brand: ENDO CARRY-ON PROCEDURE KIT

## (undated) DEVICE — Z DISCONTINUED USE 2751540 TUBING IRRIG L10IN DISP PMP ENDOGATOR

## (undated) DEVICE — WORKING LENGTH 235CM, WORKING CHANNEL 2.8MM: Brand: RESOLUTION 360 CLIP

## (undated) DEVICE — SET ADMIN 16ML TBNG L100IN 2 Y INJ SITE IV PIGGY BK DISP

## (undated) DEVICE — BW-412T DISP COMBO CLEANING BRUSH: Brand: SINGLE USE COMBINATION CLEANING BRUSH

## (undated) DEVICE — FORCEPS BX L240CM JAW DIA2.8MM L CAP W/ NDL MIC MESH TOOTH

## (undated) DEVICE — Z DISCONTINUED NO SUB IDED SET EXTN W/ 4 W STPCOCK M SPIN LOK 36IN

## (undated) DEVICE — CONNECTOR TBNG AUX H2O JET DISP FOR OLY 160/180 SER

## (undated) DEVICE — BAG SPEC BIOHZD LF 2MIL 6X10IN -- CONVERT TO ITEM 357326

## (undated) DEVICE — NEEDLE HYPO 18GA L1.5IN PNK S STL HUB POLYPR SHLD REG BVL

## (undated) DEVICE — Device: Brand: SINGLE USE INJECTOR NM600/610

## (undated) DEVICE — SUPPLEMENT DIGESTIVE H2O SOL GI-EASE

## (undated) DEVICE — AIRLIFE™ U/CONNECT-IT OXYGEN TUBING 7 FEET (2.1 M) CRUSH-RESISTANT OXYGEN TUBING, VINYL TIPPED: Brand: AIRLIFE™

## (undated) DEVICE — SOLIDIFIER FLUID 3000 CC ABSORB

## (undated) DEVICE — SYRINGE MED 20ML STD CLR PLAS LUERLOCK TIP N CTRL DISP

## (undated) DEVICE — CONTAINER SPEC 20 ML LID NEUT BUFF FORMALIN 10 % POLYPR STS

## (undated) DEVICE — CATH IV AUTOGRD BC BLU 22GA 25 -- INSYTE

## (undated) DEVICE — BAG BELONG PT PERS CLEAR HANDL

## (undated) DEVICE — Device

## (undated) DEVICE — REM POLYHESIVE ADULT PATIENT RETURN ELECTRODE: Brand: VALLEYLAB

## (undated) DEVICE — UNDERPAD XTR STRGHT 30X36IN --

## (undated) DEVICE — 1200 GUARD II KIT W/5MM TUBE W/O VAC TUBE: Brand: GUARDIAN